# Patient Record
Sex: MALE | Race: WHITE
[De-identification: names, ages, dates, MRNs, and addresses within clinical notes are randomized per-mention and may not be internally consistent; named-entity substitution may affect disease eponyms.]

---

## 2024-08-26 PROBLEM — Z00.00 ENCOUNTER FOR PREVENTIVE HEALTH EXAMINATION: Status: ACTIVE | Noted: 2024-08-26

## 2024-08-29 ENCOUNTER — APPOINTMENT (OUTPATIENT)
Dept: ORTHOPEDIC SURGERY | Facility: CLINIC | Age: 54
End: 2024-08-29
Payer: MEDICARE

## 2024-08-29 VITALS — BODY MASS INDEX: 24.5 KG/M2 | WEIGHT: 175 LBS | HEIGHT: 71 IN

## 2024-08-29 DIAGNOSIS — J45.909 UNSPECIFIED ASTHMA, UNCOMPLICATED: ICD-10-CM

## 2024-08-29 DIAGNOSIS — E11.9 TYPE 2 DIABETES MELLITUS W/OUT COMPLICATIONS: ICD-10-CM

## 2024-08-29 DIAGNOSIS — M47.12 OTHER SPONDYLOSIS WITH MYELOPATHY, CERVICAL REGION: ICD-10-CM

## 2024-08-29 DIAGNOSIS — I10 ESSENTIAL (PRIMARY) HYPERTENSION: ICD-10-CM

## 2024-08-29 PROCEDURE — 99204 OFFICE O/P NEW MOD 45 MIN: CPT

## 2024-08-29 RX ORDER — OXYCODONE 10 MG/1
10 TABLET ORAL
Refills: 0 | Status: ACTIVE | COMMUNITY

## 2024-08-29 RX ORDER — ALPRAZOLAM 0.5 MG/1
0.5 TABLET ORAL
Refills: 0 | Status: ACTIVE | COMMUNITY

## 2024-08-29 RX ORDER — METFORMIN HYDROCHLORIDE 1000 MG/1
1000 TABLET, FILM COATED, EXTENDED RELEASE ORAL
Refills: 0 | Status: ACTIVE | COMMUNITY

## 2024-08-29 NOTE — HISTORY OF PRESENT ILLNESS
[8] : 8 [7] : 7 [Dull/Aching] : dull/aching [de-identified] : 53 M with chronic neck and arm pain. Hx of ACDF done 20 years ago.  C5-7.  Did well for a hwl ahs had chronic neck pain and arm pain treated with oxycodone.  Had issues with pain medication recently due to switching doctors. Saw primary surgeon recently who recommend CT can and possible neck surgery.  Has increased hand dysfunction.  No bowel or bladder symptoms. as balance issues but partially due to amputation.   [] : no [FreeTextEntry1] : neck [FreeTextEntry5] : pain and nueropathy for years seen ortho,PM,neuro had MRi neck at [facility]7/11  h/o neck fusion over 15 years ago. has not had inj inneck due to blood sugar been in therapy he was hospitalized for last week due to pain meds stopped by GP

## 2024-08-29 NOTE — ASSESSMENT
[FreeTextEntry1] : 53 M with ASD and cervical myelopathy.  MRI with C3-4 and C4-5 cord compression CT C spine for presurgical planning Referral to pain medication doctor for oxycodone FU after CT scan to further discuss surgier. likely C3-C5 laminectomy andC3-C7  fusion

## 2024-08-29 NOTE — IMAGING
[de-identified] : Spine: Inspection/Palpation: No tenderness to palpation throughout Cervical/thoracic/lumbar spine.  B/L hand atrophy well healed No bony step-offs, No lesions.   Gait: antalgic,     Neurologic: Bilateral upper extremities 5/5 Deltoid/Biceps/Triceps/ Wrist Flexion/Wrist Extension/ / Intrinsics Except   Sensation intact to light touch C5-T1      Negative Guillen's,  No inverted brachioradialis reflex        MRI Cervical spine reviewed and interpreted independently.  Agree with report as follows. . Status post anterior cervical discectomy and fusion at C5-C6 and C6-C7 without significant residual spinal canal stenosis or nerve compression at the surgical levels. 2. Degenerative changes with spinal canal stenosis and cord compression at C3-C4 and C4-C5. No cord signal abnormality appreciated. 3. Multilevel neural foraminal stenosis outlined above.

## 2024-09-09 ENCOUNTER — APPOINTMENT (OUTPATIENT)
Dept: CT IMAGING | Facility: CLINIC | Age: 54
End: 2024-09-09

## 2024-09-23 ENCOUNTER — APPOINTMENT (OUTPATIENT)
Dept: ORTHOPEDIC SURGERY | Facility: CLINIC | Age: 54
End: 2024-09-23
Payer: MEDICARE

## 2024-09-23 VITALS — WEIGHT: 175 LBS | HEIGHT: 71 IN | BODY MASS INDEX: 24.5 KG/M2

## 2024-09-23 DIAGNOSIS — M47.12 OTHER SPONDYLOSIS WITH MYELOPATHY, CERVICAL REGION: ICD-10-CM

## 2024-09-23 PROCEDURE — 99215 OFFICE O/P EST HI 40 MIN: CPT

## 2024-09-23 NOTE — IMAGING
[de-identified] : Spine: Inspection/Palpation: No tenderness to palpation throughout Cervical/thoracic/lumbar spine.  B/L hand atrophy well healed No bony step-offs, No lesions.   Gait: antalgic,     Neurologic: Bilateral upper extremities 5/5 Deltoid/Biceps/Triceps/ Wrist Flexion/Wrist Extension/ / Intrinsics Except   Sensation intact to light touch C5-T1      Negative Guillen's,  No inverted brachioradialis reflex        MRI Cervical spine reviewed and interpreted independently.  Agree with report as follows. . Status post anterior cervical discectomy and fusion at C5-C6 and C6-C7 without significant residual spinal canal stenosis or nerve compression at the surgical levels. 2. Degenerative changes with spinal canal stenosis and cord compression at C3-C4 and C4-C5. No cord signal abnormality appreciated. 3. Multilevel neural foraminal stenosis outlined above.

## 2024-09-23 NOTE — HISTORY OF PRESENT ILLNESS
[8] : 8 [7] : 7 [Dull/Aching] : dull/aching [de-identified] : 09/23/2024: Follow up visit. CT cervical review today.   8/29[birth year]: 53 M with chronic neck and arm pain. Hx of ACDF done 20 years ago.  C5-7.  Did well for a hwl ahs had chronic neck pain and arm pain treated with oxycodone.  Had issues with pain medication recently due to switching doctors. Saw primary surgeon recently who recommend CT can and possible neck surgery.  Has increased hand dysfunction.  No bowel or bladder symptoms. as balance issues but partially due to amputation.   [] : no [FreeTextEntry1] : neck

## 2024-09-23 NOTE — ASSESSMENT
[FreeTextEntry1] : 53 M with ASD and cervical myelopathy.  MRI with C3-4 and C4-5 cord compression CT C spine for presurgical planning Referral to pain medication doctor for oxycodone FU after CT scan to further discuss surgery. C3-C5 laminectomy and C3-C7  fusion   Risks and benefits of surgery including but not limited to bleeding, infection, damage to surrounding structures, hardware complication, pseudoarthrosis, need for revision surgery, recurrent laryngeal nerve palsy, paralysis, anesthetic complications, persistent symptoms, persistent neck pain, were discussed

## 2024-09-27 RX ORDER — TRAMADOL HYDROCHLORIDE 50 MG/1
50 TABLET, COATED ORAL EVERY 8 HOURS
Qty: 30 | Refills: 0 | Status: ACTIVE | COMMUNITY
Start: 2024-09-27 | End: 1900-01-01

## 2024-10-03 RX ORDER — SODIUM CHLORIDE IRRIG SOLUTION 0.9 %
1000 SOLUTION, IRRIGATION IRRIGATION
Refills: 0 | Status: DISCONTINUED | OUTPATIENT
Start: 2024-10-10 | End: 2024-10-10

## 2024-10-03 RX ORDER — SODIUM CHLORIDE 0.9 % (FLUSH) 0.9 %
3 SYRINGE (ML) INJECTION EVERY 8 HOURS
Refills: 0 | Status: DISCONTINUED | OUTPATIENT
Start: 2024-10-10 | End: 2024-10-10

## 2024-10-03 RX ORDER — CHLORHEXIDINE GLUCONATE ORAL RINSE 1.2 MG/ML
1 SOLUTION DENTAL DAILY
Refills: 0 | Status: DISCONTINUED | OUTPATIENT
Start: 2024-10-10 | End: 2024-10-10

## 2024-10-09 NOTE — ASU PATIENT PROFILE, ADULT - NSICDXPASTMEDICALHX_GEN_ALL_CORE_FT
PAST MEDICAL HISTORY:  Asthma     H/O osteomyelitis     Obstructive sleep apnea     Other spondylosis with myelopathy, cervical region     Type 2 diabetes mellitus

## 2024-10-09 NOTE — ASU PATIENT PROFILE, ADULT - NSICDXPASTSURGICALHX_GEN_ALL_CORE_FT
PAST SURGICAL HISTORY:  Amputation of one or more toes     H/O cervical discectomy     History of left below knee amputation

## 2024-10-10 ENCOUNTER — APPOINTMENT (OUTPATIENT)
Dept: ORTHOPEDIC SURGERY | Facility: HOSPITAL | Age: 54
End: 2024-10-10

## 2024-10-10 ENCOUNTER — INPATIENT (INPATIENT)
Facility: HOSPITAL | Age: 54
LOS: 0 days | Discharge: HOME | End: 2024-10-10
Attending: STUDENT IN AN ORGANIZED HEALTH CARE EDUCATION/TRAINING PROGRAM | Admitting: STUDENT IN AN ORGANIZED HEALTH CARE EDUCATION/TRAINING PROGRAM

## 2024-10-10 VITALS
SYSTOLIC BLOOD PRESSURE: 141 MMHG | HEART RATE: 71 BPM | TEMPERATURE: 98 F | OXYGEN SATURATION: 98 % | RESPIRATION RATE: 16 BRPM | WEIGHT: 190.04 LBS | DIASTOLIC BLOOD PRESSURE: 78 MMHG | HEIGHT: 69.5 IN

## 2024-10-10 DIAGNOSIS — S98.139A COMPLETE TRAUMATIC AMPUTATION OF ONE UNSPECIFIED LESSER TOE, INITIAL ENCOUNTER: Chronic | ICD-10-CM

## 2024-10-10 DIAGNOSIS — M47.12 OTHER SPONDYLOSIS WITH MYELOPATHY, CERVICAL REGION: ICD-10-CM

## 2024-10-10 DIAGNOSIS — Z98.890 OTHER SPECIFIED POSTPROCEDURAL STATES: Chronic | ICD-10-CM

## 2024-10-10 DIAGNOSIS — Z89.512 ACQUIRED ABSENCE OF LEFT LEG BELOW KNEE: Chronic | ICD-10-CM

## 2024-10-10 LAB
GLUCOSE BLDC GLUCOMTR-MCNC: 279 MG/DL — HIGH (ref 70–99)
GLUCOSE BLDC GLUCOMTR-MCNC: 313 MG/DL — HIGH (ref 70–99)

## 2024-10-10 RX ORDER — INSULIN REGULAR, HUMAN 100/ML
4 VIAL (ML) INJECTION ONCE
Refills: 0 | Status: COMPLETED | OUTPATIENT
Start: 2024-10-10 | End: 2024-10-10

## 2024-10-10 RX ORDER — ACETAMINOPHEN 325 MG
975 TABLET ORAL ONCE
Refills: 0 | Status: COMPLETED | OUTPATIENT
Start: 2024-10-10 | End: 2024-10-10

## 2024-10-10 RX ORDER — PREGABALIN 25 MG/1
150 CAPSULE ORAL ONCE
Refills: 0 | Status: DISCONTINUED | OUTPATIENT
Start: 2024-10-10 | End: 2024-10-10

## 2024-10-10 RX ORDER — POVIDONE-IODINE 10 %
1 SOLUTION, NON-ORAL TOPICAL ONCE
Refills: 0 | Status: COMPLETED | OUTPATIENT
Start: 2024-10-10 | End: 2024-10-10

## 2024-10-10 RX ORDER — TRAMADOL HYDROCHLORIDE 50 MG/1
50 TABLET, COATED ORAL ONCE
Refills: 0 | Status: DISCONTINUED | OUTPATIENT
Start: 2024-10-10 | End: 2024-10-10

## 2024-10-10 RX ORDER — PANTOPRAZOLE SODIUM 40 MG/1
40 TABLET, DELAYED RELEASE ORAL ONCE
Refills: 0 | Status: COMPLETED | OUTPATIENT
Start: 2024-10-10 | End: 2024-10-10

## 2024-10-10 RX ADMIN — TRAMADOL HYDROCHLORIDE 50 MILLIGRAM(S): 50 TABLET, COATED ORAL at 07:30

## 2024-10-10 RX ADMIN — Medication 975 MILLIGRAM(S): at 07:30

## 2024-10-10 RX ADMIN — Medication 4 UNIT(S): at 07:45

## 2024-10-10 RX ADMIN — CHLORHEXIDINE GLUCONATE ORAL RINSE 1 APPLICATION(S): 1.2 SOLUTION DENTAL at 07:31

## 2024-10-10 RX ADMIN — PANTOPRAZOLE SODIUM 40 MILLIGRAM(S): 40 TABLET, DELAYED RELEASE ORAL at 07:29

## 2024-10-10 RX ADMIN — PREGABALIN 150 MILLIGRAM(S): 25 CAPSULE ORAL at 07:29

## 2024-10-10 RX ADMIN — Medication 1 APPLICATION(S): at 08:05

## 2024-10-10 RX ADMIN — Medication 30 MILLILITER(S): at 07:30

## 2024-10-10 NOTE — PROVIDER CONTACT NOTE (OTHER) - ASSESSMENT
patient asymptomatic, 4 units regular insulin given, rechecked DOUG Silverio MD made aware  Case cancelled by Dr Arvizu,

## 2024-10-10 NOTE — PROVIDER CONTACT NOTE (OTHER) - SITUATION
ASU pre surgical blood glucose 313, ASU pre surgical patient blood glucose 313. Anesthesia Dr Nguyen and dr Arvizu made aware

## 2024-10-11 PROBLEM — J45.909 UNSPECIFIED ASTHMA, UNCOMPLICATED: Chronic | Status: ACTIVE | Noted: 2024-10-03

## 2024-10-11 PROBLEM — M47.12 OTHER SPONDYLOSIS WITH MYELOPATHY, CERVICAL REGION: Chronic | Status: ACTIVE | Noted: 2024-10-03

## 2024-10-11 PROBLEM — Z87.39 PERSONAL HISTORY OF OTHER DISEASES OF THE MUSCULOSKELETAL SYSTEM AND CONNECTIVE TISSUE: Chronic | Status: ACTIVE | Noted: 2024-10-03

## 2024-10-11 PROBLEM — G47.33 OBSTRUCTIVE SLEEP APNEA (ADULT) (PEDIATRIC): Chronic | Status: ACTIVE | Noted: 2024-10-03

## 2024-10-11 PROBLEM — E11.9 TYPE 2 DIABETES MELLITUS WITHOUT COMPLICATIONS: Chronic | Status: ACTIVE | Noted: 2024-10-03

## 2024-10-18 RX ORDER — OXYCODONE 5 MG/1
5 TABLET ORAL
Qty: 30 | Refills: 0 | Status: ACTIVE | COMMUNITY
Start: 2024-10-11 | End: 1900-01-01

## 2024-10-21 ENCOUNTER — APPOINTMENT (OUTPATIENT)
Dept: ORTHOPEDIC SURGERY | Facility: CLINIC | Age: 54
End: 2024-10-21
Payer: MEDICARE

## 2024-10-21 ENCOUNTER — APPOINTMENT (OUTPATIENT)
Dept: ENDOCRINOLOGY | Facility: CLINIC | Age: 54
End: 2024-10-21
Payer: MEDICARE

## 2024-10-21 VITALS — BODY MASS INDEX: 28.28 KG/M2 | HEIGHT: 71 IN | WEIGHT: 202 LBS

## 2024-10-21 VITALS
WEIGHT: 202 LBS | HEIGHT: 71 IN | HEART RATE: 105 BPM | BODY MASS INDEX: 28.28 KG/M2 | SYSTOLIC BLOOD PRESSURE: 140 MMHG | OXYGEN SATURATION: 98 % | DIASTOLIC BLOOD PRESSURE: 86 MMHG

## 2024-10-21 DIAGNOSIS — E11.9 TYPE 2 DIABETES MELLITUS W/OUT COMPLICATIONS: ICD-10-CM

## 2024-10-21 LAB
GLUCOSE BLDC GLUCOMTR-MCNC: 282
HBA1C MFR BLD HPLC: 8.1

## 2024-10-21 PROCEDURE — 82962 GLUCOSE BLOOD TEST: CPT

## 2024-10-21 PROCEDURE — G2211 COMPLEX E/M VISIT ADD ON: CPT

## 2024-10-21 PROCEDURE — 99205 OFFICE O/P NEW HI 60 MIN: CPT

## 2024-10-21 PROCEDURE — 99213 OFFICE O/P EST LOW 20 MIN: CPT

## 2024-10-21 PROCEDURE — 83036 HEMOGLOBIN GLYCOSYLATED A1C: CPT | Mod: QW

## 2024-10-21 RX ORDER — LANCING DEVICE
W/DEVICE EACH MISCELLANEOUS
Qty: 1 | Refills: 0 | Status: ACTIVE | COMMUNITY
Start: 2024-10-21 | End: 1900-01-01

## 2024-10-21 RX ORDER — INSULIN GLARGINE 100 [IU]/ML
100 INJECTION, SOLUTION SUBCUTANEOUS
Qty: 1 | Refills: 0 | Status: ACTIVE | COMMUNITY
Start: 2024-10-21 | End: 1900-01-01

## 2024-10-21 RX ORDER — PEN NEEDLE, DIABETIC 29 G X1/2"
32G X 4 MM NEEDLE, DISPOSABLE MISCELLANEOUS
Qty: 1 | Refills: 0 | Status: ACTIVE | COMMUNITY
Start: 2024-10-21 | End: 1900-01-01

## 2024-10-21 RX ORDER — ALCOHOL ANTISEPTIC PADS
PADS, MEDICATED (EA) TOPICAL
Qty: 3 | Refills: 3 | Status: ACTIVE | COMMUNITY
Start: 2024-10-21 | End: 1900-01-01

## 2024-10-21 RX ORDER — CHOLECALCIFEROL (VITAMIN D3) 10(400)/ML
DROPS ORAL
Qty: 3 | Refills: 3 | Status: ACTIVE | COMMUNITY
Start: 2024-10-21 | End: 1900-01-01

## 2024-10-21 RX ORDER — LANCING DEVICE
EACH MISCELLANEOUS 3 TIMES DAILY
Qty: 6 | Refills: 1 | Status: ACTIVE | COMMUNITY
Start: 2024-10-21 | End: 1900-01-01

## 2024-10-25 ENCOUNTER — RX RENEWAL (OUTPATIENT)
Age: 54
End: 2024-10-25

## 2024-10-29 ENCOUNTER — NON-APPOINTMENT (OUTPATIENT)
Age: 54
End: 2024-10-29

## 2024-10-29 RX ORDER — METFORMIN HYDROCHLORIDE 1000 MG/1
1000 TABLET, COATED ORAL
Qty: 180 | Refills: 0 | Status: ACTIVE | COMMUNITY
Start: 2024-10-29 | End: 1900-01-01

## 2024-10-31 ENCOUNTER — RX RENEWAL (OUTPATIENT)
Age: 54
End: 2024-10-31

## 2024-11-01 ENCOUNTER — APPOINTMENT (OUTPATIENT)
Dept: ORTHOPEDIC SURGERY | Facility: CLINIC | Age: 54
End: 2024-11-01
Payer: MEDICARE

## 2024-11-01 DIAGNOSIS — M47.12 OTHER SPONDYLOSIS WITH MYELOPATHY, CERVICAL REGION: ICD-10-CM

## 2024-11-01 PROCEDURE — 99213 OFFICE O/P EST LOW 20 MIN: CPT

## 2024-11-13 ENCOUNTER — RX RENEWAL (OUTPATIENT)
Age: 54
End: 2024-11-13

## 2024-11-15 ENCOUNTER — OUTPATIENT (OUTPATIENT)
Dept: OUTPATIENT SERVICES | Facility: HOSPITAL | Age: 54
LOS: 1 days | End: 2024-11-15

## 2024-11-15 VITALS
HEIGHT: 70 IN | OXYGEN SATURATION: 98 % | RESPIRATION RATE: 16 BRPM | DIASTOLIC BLOOD PRESSURE: 89 MMHG | HEART RATE: 91 BPM | TEMPERATURE: 98 F | WEIGHT: 201.94 LBS | SYSTOLIC BLOOD PRESSURE: 170 MMHG

## 2024-11-15 DIAGNOSIS — G89.29 OTHER CHRONIC PAIN: ICD-10-CM

## 2024-11-15 DIAGNOSIS — E11.9 TYPE 2 DIABETES MELLITUS WITHOUT COMPLICATIONS: ICD-10-CM

## 2024-11-15 DIAGNOSIS — Z98.890 OTHER SPECIFIED POSTPROCEDURAL STATES: Chronic | ICD-10-CM

## 2024-11-15 DIAGNOSIS — M47.12 OTHER SPONDYLOSIS WITH MYELOPATHY, CERVICAL REGION: ICD-10-CM

## 2024-11-15 DIAGNOSIS — Z89.421 ACQUIRED ABSENCE OF OTHER RIGHT TOE(S): Chronic | ICD-10-CM

## 2024-11-15 DIAGNOSIS — Z89.512 ACQUIRED ABSENCE OF LEFT LEG BELOW KNEE: Chronic | ICD-10-CM

## 2024-11-15 DIAGNOSIS — S98.139A COMPLETE TRAUMATIC AMPUTATION OF ONE UNSPECIFIED LESSER TOE, INITIAL ENCOUNTER: Chronic | ICD-10-CM

## 2024-11-15 LAB
A1C WITH ESTIMATED AVERAGE GLUCOSE RESULT: 8 % — HIGH (ref 4–5.6)
ANION GAP SERPL CALC-SCNC: 13 MMOL/L — SIGNIFICANT CHANGE UP (ref 7–14)
BASOPHILS # BLD AUTO: 0.03 K/UL — SIGNIFICANT CHANGE UP (ref 0–0.2)
BASOPHILS NFR BLD AUTO: 0.4 % — SIGNIFICANT CHANGE UP (ref 0–2)
BLD GP AB SCN SERPL QL: NEGATIVE — SIGNIFICANT CHANGE UP
BUN SERPL-MCNC: 28 MG/DL — HIGH (ref 7–23)
CALCIUM SERPL-MCNC: 9.4 MG/DL — SIGNIFICANT CHANGE UP (ref 8.4–10.5)
CHLORIDE SERPL-SCNC: 104 MMOL/L — SIGNIFICANT CHANGE UP (ref 98–107)
CO2 SERPL-SCNC: 23 MMOL/L — SIGNIFICANT CHANGE UP (ref 22–31)
CREAT SERPL-MCNC: 0.94 MG/DL — SIGNIFICANT CHANGE UP (ref 0.5–1.3)
EGFR: 96 ML/MIN/1.73M2 — SIGNIFICANT CHANGE UP
EOSINOPHIL # BLD AUTO: 0.25 K/UL — SIGNIFICANT CHANGE UP (ref 0–0.5)
EOSINOPHIL NFR BLD AUTO: 3.1 % — SIGNIFICANT CHANGE UP (ref 0–6)
ESTIMATED AVERAGE GLUCOSE: 183 — SIGNIFICANT CHANGE UP
GLUCOSE SERPL-MCNC: 196 MG/DL — HIGH (ref 70–99)
HCT VFR BLD CALC: 40.1 % — SIGNIFICANT CHANGE UP (ref 39–50)
HGB BLD-MCNC: 13 G/DL — SIGNIFICANT CHANGE UP (ref 13–17)
IMM GRANULOCYTES NFR BLD AUTO: 0.4 % — SIGNIFICANT CHANGE UP (ref 0–0.9)
LYMPHOCYTES # BLD AUTO: 1.34 K/UL — SIGNIFICANT CHANGE UP (ref 1–3.3)
LYMPHOCYTES # BLD AUTO: 16.7 % — SIGNIFICANT CHANGE UP (ref 13–44)
MCHC RBC-ENTMCNC: 28.8 PG — SIGNIFICANT CHANGE UP (ref 27–34)
MCHC RBC-ENTMCNC: 32.4 G/DL — SIGNIFICANT CHANGE UP (ref 32–36)
MCV RBC AUTO: 88.9 FL — SIGNIFICANT CHANGE UP (ref 80–100)
MONOCYTES # BLD AUTO: 0.43 K/UL — SIGNIFICANT CHANGE UP (ref 0–0.9)
MONOCYTES NFR BLD AUTO: 5.3 % — SIGNIFICANT CHANGE UP (ref 2–14)
MRSA PCR RESULT.: SIGNIFICANT CHANGE UP
NEUTROPHILS # BLD AUTO: 5.96 K/UL — SIGNIFICANT CHANGE UP (ref 1.8–7.4)
NEUTROPHILS NFR BLD AUTO: 74.1 % — SIGNIFICANT CHANGE UP (ref 43–77)
PLATELET # BLD AUTO: 202 K/UL — SIGNIFICANT CHANGE UP (ref 150–400)
POTASSIUM SERPL-MCNC: 4.9 MMOL/L — SIGNIFICANT CHANGE UP (ref 3.5–5.3)
POTASSIUM SERPL-SCNC: 4.9 MMOL/L — SIGNIFICANT CHANGE UP (ref 3.5–5.3)
RBC # BLD: 4.51 M/UL — SIGNIFICANT CHANGE UP (ref 4.2–5.8)
RBC # FLD: 13.2 % — SIGNIFICANT CHANGE UP (ref 10.3–14.5)
RH IG SCN BLD-IMP: POSITIVE — SIGNIFICANT CHANGE UP
S AUREUS DNA NOSE QL NAA+PROBE: SIGNIFICANT CHANGE UP
SODIUM SERPL-SCNC: 140 MMOL/L — SIGNIFICANT CHANGE UP (ref 135–145)
WBC # BLD: 8.04 K/UL — SIGNIFICANT CHANGE UP (ref 3.8–10.5)
WBC # FLD AUTO: 8.04 K/UL — SIGNIFICANT CHANGE UP (ref 3.8–10.5)

## 2024-11-15 RX ORDER — OXYCODONE HYDROCHLORIDE 30 MG/1
0 TABLET ORAL
Refills: 0 | DISCHARGE

## 2024-11-15 NOTE — H&P PST ADULT - HISTORY OF PRESENT ILLNESS
53 male poor historian presents for preop eval for scheduled C3-5 laminectomy, C3-7 posterior spinal fusion.  Pt reports  chronic neck and arm pain for several years that has progressively worsened on tramadol PRN. Hx of anterior cervical discectomy and fusion  done 20 years ago. C5-7.   States was on oxycodone for  chronic neck pain and arm pain, but had issues with pain medication because he switched doctors.  Pt had recent CT scan  & possible surgery.  C/o increased   weakness & numbness left arm. 53 male poor historian presents for preop eval for scheduled C3-5 laminectomy, C3-7 posterior spinal fusion.  Pt reports  chronic neck and arm pain for several years that has progressively worsened. Hx of anterior cervical discectomy and fusion  done 20 years ago. C5-7.   States was on oxycodone for  chronic neck pain and arm pain, but had issues with pain medication because he switched doctors. C/o increased   weakness & numbness left arm. Intinial procedure was in october, but procedure was cancelled due to elevated BG remaining >200 despite insulin given by anesthesiologist. patient was evaluated by endocrine was started on insulin, as per patient patient his BS better control now 53 male poor historian presents for preop eval for scheduled C3-5 laminectomy, C3-7 posterior spinal fusion.  Pt reports  chronic neck and arm pain for several years that has progressively worsened. Hx of anterior cervical discectomy and fusion  done 20 years ago. C5-7.   States was on oxycodone for  chronic neck pain and arm pain, but had issues with pain medication because he switched doctors. C/o increased   weakness & numbness left arm. Intinial procedure was in october, but procedure was cancelled due to elevated BG remaining >200 despite insulin given by anesthesiologist. Patient was evaluated by endocrine was started on insulin, as per patient patient his BS controlled now 53 male poor historian with hx of Afib, +Possible CAD, DM II and ?CHF presents for preop eval for scheduled C3-5 laminectomy, C3-7 posterior spinal fusion.  Pt reports  chronic neck and arm pain for several years that has progressively worsened. Hx of anterior cervical discectomy and fusion  done 20 years ago. C5-7.   States was on oxycodone for  chronic neck pain and arm pain, but had issues with pain medication because he switched doctors, now the pain meds prescribed by Dr Arvizu   C/o increased   weakness & numbness left arm. Initial l procedure was in october, but procedure was cancelled due to elevated BG remaining >200 despite insulin given by anesthesiologist. Patient was evaluated by endocrine was started on insulin, as per patient his BS controlled now -200

## 2024-11-15 NOTE — H&P PST ADULT - ENDOCRINE COMMENTS
type II DM on metformin and Lantus 230-250 type II DM on metformin and Lantus -250 type II DM on metformin and Lantus -200

## 2024-11-15 NOTE — H&P PST ADULT - PROBLEM SELECTOR PLAN 1
Patient tentatively scheduled for    Pre-op instructions provided.  Famotidine provided with instructions. Hibiclens provided with instructions and was signed by patient. Teach-back method was utilized to assess patient's understanding. Patient verbalized understanding.    ordered CBC, BMP, A1c, type , MRSA Patient tentatively scheduled for scheduled C3-5 laminectomy, C3-7 posterior spinal fusion.     Pre-op instructions provided.  Famotidine provided with instructions. Hibiclens provided with instructions and was signed by patient. Teach-back method was utilized to assess patient's understanding. Patient verbalized understanding.    ordered CBC, BMP, A1c, type , MRSA

## 2024-11-15 NOTE — H&P PST ADULT - LAST CARDIAC ANGIOGRAM/IMAGING
Jan 2024---at [facility], patient had PNA>>septic shock ---reduced EF?---No intervention at the time

## 2024-11-15 NOTE — H&P PST ADULT - NSICDXPASTSURGICALHX_GEN_ALL_CORE_FT
PAST SURGICAL HISTORY:  Amputation of one or more toes     H/O cervical discectomy     History of left below knee amputation     Right toe amputee

## 2024-11-15 NOTE — H&P PST ADULT - NSICDXPASTMEDICALHX_GEN_ALL_CORE_FT
PAST MEDICAL HISTORY:  Asthma     H/O osteomyelitis     Obstructive sleep apnea     Other spondylosis with myelopathy, cervical region     Pulmonary embolism     Type 2 diabetes mellitus      PAST MEDICAL HISTORY:  Afib     Asthma     CAD (coronary artery disease)     H/O osteomyelitis     Obstructive sleep apnea     Other spondylosis with myelopathy, cervical region     Pulmonary embolism     Type 2 diabetes mellitus

## 2024-11-15 NOTE — H&P PST ADULT - PROBLEM SELECTOR PLAN 2
Patient instructed to hold Metformin the night before and the morning of procedure. Pt stated understanding.     Patient instructed take only 80% of lantus usual dose night before the procedure, patient will take 14  units night before the procedure Patient instructed to hold Metformin the night before and the morning of procedure. Pt stated understanding.     Patient instructed take only 80% of Lantus usual dose night before the procedure, patient will take 14  units night before the procedure Patient instructed to hold Metformin the morning of procedure. Pt stated understanding.     Patient instructed take only 80% of Lantus usual dose night before the procedure, patient will take 14  units of Lantus night before the procedure

## 2024-11-15 NOTE — H&P PST ADULT - PROBLEM SELECTOR PLAN 3
Patient instructed to take oxycodone with a sip of water on the morning of procedure.    In Jan 2024 patient was admitted at [facility] with PNA, patient went into septic shock ---Heart failure/reduced EF?.     Afib. +Possible CAD and CHF? was told heart function was 15% but unclear. patient went to see his cardiologist on 10/24. bianka yan Patient instructed to take oxycodone with a sip of water on the morning of procedure.    In Jan 2024 patient was admitted at [facility] with PNA, patient went into septic shock ---Heart failure/reduced EF?.     Afib. +Possible CAD and CHF? was told heart function was 15% but unclear. patient saw his cardiologist on 10/1/24. will request a copy Patient instructed to take oxycodone with a sip of water on the morning of procedure.    In Jan 2024 patient was admitted at [facility] with PNA, patient went into septic shock ---Heart failure/reduced EF?.     Afib. +Possible CAD and CHF? was told heart function in Jan 2024 was 15% but unclear. patient saw his cardiologist on 10/1/24. will request a copy of cardiac note

## 2024-11-15 NOTE — H&P PST ADULT - NSICDXFAMILYHX_GEN_ALL_CORE_FT
FAMILY HISTORY:  Father  Still living? Unknown  FH: type 2 diabetes, Age at diagnosis: Age Unknown    Mother  Still living? No  Family hx of lung cancer, Age at diagnosis: Age Unknown    Grandparent  Still living? No  Family history of liver cancer, Age at diagnosis: Age Unknown  FH: type 2 diabetes, Age at diagnosis: Age Unknown  FHx: heart disease, Age at diagnosis: Age Unknown

## 2024-11-15 NOTE — H&P PST ADULT - ATTENDING COMMENTS
53 year old male with neck pain upper extremity dysfunction and gait dysfunction. History of  C5-7 ACDF and Posterior laminectomy at C6-7 as well.  Patient with Adjacent segment disease, cord compression and myelopathic symptoms. Has significant hand atrophy. Discussed natural history of myelopathy with patient at length in office and goals of surgery were to prevent progression.  Discussed unsure if he will see much increase in hand strength given severe atrophy.   MRI with C3-5 cord compression. Discussed best option is posterior cervical decompression and fusion from C3-7.  Risks and benefits of surgery including but not limited to bleeding, infection, damage to surrounding structures, paralysis, hardware malposition, hardware failure, need for revision procedure, persistent or new numbness, persistent or new weakness, persistent or new tingling, c5 palsy, durotomy, csf leak. All questions were answered and understanding was verbalized.  Patient was in agreement with plan.  Patient glucose was elevated on morn ing of surgery.  Given severe pain he is in anesthesia felt it was stress related.  Will give insulin and recheck.  AS long as sugar resolves to an improved will move ahead with surgery. Surgery was attempter 6 weeks ago but had higher sugars at that time. Since has bene evaluated and treated with endocrine with significant improvement of his blood sugars.  Given diabetes and revision posterior closure will have plastic surgery close wound.

## 2024-11-15 NOTE — H&P PST ADULT - MUSCULOSKELETAL
ROM intact/strength 5/5 bilateral upper extremities/strength 5/5 bilateral lower extremities details… ROM intact/strength 5/5 bilateral upper extremities/strength 5/5 bilateral lower extremities/abnormal gait

## 2024-11-18 ENCOUNTER — APPOINTMENT (OUTPATIENT)
Dept: ORTHOPEDIC SURGERY | Facility: CLINIC | Age: 54
End: 2024-11-18
Payer: MEDICARE

## 2024-11-18 VITALS — WEIGHT: 202 LBS | BODY MASS INDEX: 28.28 KG/M2 | HEIGHT: 71 IN

## 2024-11-18 DIAGNOSIS — M47.12 OTHER SPONDYLOSIS WITH MYELOPATHY, CERVICAL REGION: ICD-10-CM

## 2024-11-18 PROBLEM — I26.99 OTHER PULMONARY EMBOLISM WITHOUT ACUTE COR PULMONALE: Chronic | Status: ACTIVE | Noted: 2024-11-15

## 2024-11-18 PROBLEM — I48.91 UNSPECIFIED ATRIAL FIBRILLATION: Chronic | Status: ACTIVE | Noted: 2024-11-15

## 2024-11-18 PROBLEM — I25.10 ATHEROSCLEROTIC HEART DISEASE OF NATIVE CORONARY ARTERY WITHOUT ANGINA PECTORIS: Chronic | Status: ACTIVE | Noted: 2024-11-15

## 2024-11-18 PROCEDURE — 99213 OFFICE O/P EST LOW 20 MIN: CPT

## 2024-11-20 NOTE — ASU PATIENT PROFILE, ADULT - PATIENT'S PREFERRED PRONOUN
Him/He
For information on Fall & Injury Prevention, visit: [email or web address] OR  [email or web address] OR  https://www.cdc.gov/steadi/patient.html

## 2024-11-21 ENCOUNTER — INPATIENT (INPATIENT)
Facility: HOSPITAL | Age: 54
LOS: 4 days | Discharge: HOME HEALTH | End: 2024-11-26
Attending: STUDENT IN AN ORGANIZED HEALTH CARE EDUCATION/TRAINING PROGRAM | Admitting: STUDENT IN AN ORGANIZED HEALTH CARE EDUCATION/TRAINING PROGRAM
Payer: MEDICARE

## 2024-11-21 ENCOUNTER — TRANSCRIPTION ENCOUNTER (OUTPATIENT)
Age: 54
End: 2024-11-21

## 2024-11-21 ENCOUNTER — APPOINTMENT (OUTPATIENT)
Dept: ORTHOPEDIC SURGERY | Facility: HOSPITAL | Age: 54
End: 2024-11-21
Payer: MEDICARE

## 2024-11-21 VITALS
OXYGEN SATURATION: 98 % | HEART RATE: 99 BPM | WEIGHT: 201.94 LBS | RESPIRATION RATE: 16 BRPM | HEIGHT: 70 IN | DIASTOLIC BLOOD PRESSURE: 98 MMHG | SYSTOLIC BLOOD PRESSURE: 158 MMHG | TEMPERATURE: 98 F

## 2024-11-21 DIAGNOSIS — E11.65 TYPE 2 DIABETES MELLITUS WITH HYPERGLYCEMIA: ICD-10-CM

## 2024-11-21 DIAGNOSIS — S98.139A COMPLETE TRAUMATIC AMPUTATION OF ONE UNSPECIFIED LESSER TOE, INITIAL ENCOUNTER: Chronic | ICD-10-CM

## 2024-11-21 DIAGNOSIS — M47.12 OTHER SPONDYLOSIS WITH MYELOPATHY, CERVICAL REGION: ICD-10-CM

## 2024-11-21 DIAGNOSIS — Z89.421 ACQUIRED ABSENCE OF OTHER RIGHT TOE(S): Chronic | ICD-10-CM

## 2024-11-21 DIAGNOSIS — Z98.890 OTHER SPECIFIED POSTPROCEDURAL STATES: Chronic | ICD-10-CM

## 2024-11-21 DIAGNOSIS — Z89.512 ACQUIRED ABSENCE OF LEFT LEG BELOW KNEE: Chronic | ICD-10-CM

## 2024-11-21 LAB
ANION GAP SERPL CALC-SCNC: 12 MMOL/L — SIGNIFICANT CHANGE UP (ref 7–14)
BASOPHILS # BLD AUTO: 0.02 K/UL — SIGNIFICANT CHANGE UP (ref 0–0.2)
BASOPHILS NFR BLD AUTO: 0.2 % — SIGNIFICANT CHANGE UP (ref 0–2)
BUN SERPL-MCNC: 34 MG/DL — HIGH (ref 7–23)
CALCIUM SERPL-MCNC: 9 MG/DL — SIGNIFICANT CHANGE UP (ref 8.4–10.5)
CHLORIDE SERPL-SCNC: 108 MMOL/L — HIGH (ref 98–107)
CO2 SERPL-SCNC: 18 MMOL/L — LOW (ref 22–31)
CREAT SERPL-MCNC: 0.97 MG/DL — SIGNIFICANT CHANGE UP (ref 0.5–1.3)
EGFR: 92 ML/MIN/1.73M2 — SIGNIFICANT CHANGE UP
EOSINOPHIL # BLD AUTO: 0.02 K/UL — SIGNIFICANT CHANGE UP (ref 0–0.5)
EOSINOPHIL NFR BLD AUTO: 0.2 % — SIGNIFICANT CHANGE UP (ref 0–6)
GAS PNL BLDA: SIGNIFICANT CHANGE UP
GLUCOSE BLDC GLUCOMTR-MCNC: 266 MG/DL — HIGH (ref 70–99)
GLUCOSE BLDC GLUCOMTR-MCNC: 357 MG/DL — HIGH (ref 70–99)
GLUCOSE BLDC GLUCOMTR-MCNC: 368 MG/DL — HIGH (ref 70–99)
GLUCOSE BLDC GLUCOMTR-MCNC: 386 MG/DL — HIGH (ref 70–99)
GLUCOSE SERPL-MCNC: 386 MG/DL — HIGH (ref 70–99)
HCT VFR BLD CALC: 36.4 % — LOW (ref 39–50)
HGB BLD-MCNC: 12.3 G/DL — LOW (ref 13–17)
IANC: 9.88 K/UL — HIGH (ref 1.8–7.4)
IMM GRANULOCYTES NFR BLD AUTO: 0.8 % — SIGNIFICANT CHANGE UP (ref 0–0.9)
LYMPHOCYTES # BLD AUTO: 0.49 K/UL — LOW (ref 1–3.3)
LYMPHOCYTES # BLD AUTO: 4.6 % — LOW (ref 13–44)
MCHC RBC-ENTMCNC: 29 PG — SIGNIFICANT CHANGE UP (ref 27–34)
MCHC RBC-ENTMCNC: 33.8 G/DL — SIGNIFICANT CHANGE UP (ref 32–36)
MCV RBC AUTO: 85.8 FL — SIGNIFICANT CHANGE UP (ref 80–100)
MONOCYTES # BLD AUTO: 0.06 K/UL — SIGNIFICANT CHANGE UP (ref 0–0.9)
MONOCYTES NFR BLD AUTO: 0.6 % — LOW (ref 2–14)
NEUTROPHILS # BLD AUTO: 9.88 K/UL — HIGH (ref 1.8–7.4)
NEUTROPHILS NFR BLD AUTO: 93.6 % — HIGH (ref 43–77)
NRBC # BLD: 0 /100 WBCS — SIGNIFICANT CHANGE UP (ref 0–0)
NRBC # FLD: 0 K/UL — SIGNIFICANT CHANGE UP (ref 0–0)
PLATELET # BLD AUTO: 180 K/UL — SIGNIFICANT CHANGE UP (ref 150–400)
POTASSIUM SERPL-MCNC: 4.6 MMOL/L — SIGNIFICANT CHANGE UP (ref 3.5–5.3)
POTASSIUM SERPL-SCNC: 4.6 MMOL/L — SIGNIFICANT CHANGE UP (ref 3.5–5.3)
RBC # BLD: 4.24 M/UL — SIGNIFICANT CHANGE UP (ref 4.2–5.8)
RBC # FLD: 13.1 % — SIGNIFICANT CHANGE UP (ref 10.3–14.5)
SODIUM SERPL-SCNC: 138 MMOL/L — SIGNIFICANT CHANGE UP (ref 135–145)
WBC # BLD: 10.55 K/UL — HIGH (ref 3.8–10.5)
WBC # FLD AUTO: 10.55 K/UL — HIGH (ref 3.8–10.5)

## 2024-11-21 PROCEDURE — 99358 PROLONG SERVICE W/O CONTACT: CPT | Mod: NC,GC

## 2024-11-21 PROCEDURE — 22600 ARTHRD PST TQ 1NTRSPC CRV: CPT

## 2024-11-21 PROCEDURE — 63048 LAM FACETEC &FORAMOT EA ADDL: CPT

## 2024-11-21 PROCEDURE — 63045 LAM FACETEC & FORAMOT CRV: CPT

## 2024-11-21 PROCEDURE — 20936 SP BONE AGRFT LOCAL ADD-ON: CPT

## 2024-11-21 PROCEDURE — 22842 INSERT SPINE FIXATION DEVICE: CPT

## 2024-11-21 PROCEDURE — 20930 SP BONE ALGRFT MORSEL ADD-ON: CPT

## 2024-11-21 PROCEDURE — 22614 ARTHRD PST TQ 1NTRSPC EA ADD: CPT

## 2024-11-21 DEVICE — SET SCREW VP 2 TULIP AND LAMINAR HOOK: Type: IMPLANTABLE DEVICE | Status: FUNCTIONAL

## 2024-11-21 DEVICE — IMPLANTABLE DEVICE: Type: IMPLANTABLE DEVICE | Status: FUNCTIONAL

## 2024-11-21 DEVICE — SCREW MA 3.5X12MM: Type: IMPLANTABLE DEVICE | Status: FUNCTIONAL

## 2024-11-21 DEVICE — BONE WAX 2.5GM: Type: IMPLANTABLE DEVICE | Status: FUNCTIONAL

## 2024-11-21 DEVICE — SURGIFLO MATRIX WITH THROMBIN KIT: Type: IMPLANTABLE DEVICE | Status: FUNCTIONAL

## 2024-11-21 DEVICE — MAYFIELD SKULL PIN ADULT PLASTIC: Type: IMPLANTABLE DEVICE | Status: FUNCTIONAL

## 2024-11-21 DEVICE — SURGIFOAM PAD 8CM X 12.5CM X 2MM (100C): Type: IMPLANTABLE DEVICE | Status: FUNCTIONAL

## 2024-11-21 RX ORDER — OXYCODONE HYDROCHLORIDE 30 MG/1
5 TABLET ORAL ONCE
Refills: 0 | Status: DISCONTINUED | OUTPATIENT
Start: 2024-11-21 | End: 2024-11-21

## 2024-11-21 RX ORDER — HYDROMORPHONE HYDROCHLORIDE 2 MG/1
0.5 TABLET ORAL
Refills: 0 | Status: DISCONTINUED | OUTPATIENT
Start: 2024-11-21 | End: 2024-11-22

## 2024-11-21 RX ORDER — POLYETHYLENE GLYCOL 3350 17 G/17G
17 POWDER, FOR SOLUTION ORAL DAILY
Refills: 0 | Status: DISCONTINUED | OUTPATIENT
Start: 2024-11-21 | End: 2024-11-26

## 2024-11-21 RX ORDER — ACETAMINOPHEN 500MG 500 MG/1
975 TABLET, COATED ORAL ONCE
Refills: 0 | Status: COMPLETED | OUTPATIENT
Start: 2024-11-21 | End: 2024-11-21

## 2024-11-21 RX ORDER — HYDROMORPHONE HYDROCHLORIDE 2 MG/1
1 TABLET ORAL
Refills: 0 | Status: DISCONTINUED | OUTPATIENT
Start: 2024-11-21 | End: 2024-11-21

## 2024-11-21 RX ORDER — 0.9 % SODIUM CHLORIDE 0.9 %
1000 INTRAVENOUS SOLUTION INTRAVENOUS
Refills: 0 | Status: DISCONTINUED | OUTPATIENT
Start: 2024-11-21 | End: 2024-11-26

## 2024-11-21 RX ORDER — PANTOPRAZOLE SODIUM 40 MG/1
40 TABLET, DELAYED RELEASE ORAL
Refills: 0 | Status: DISCONTINUED | OUTPATIENT
Start: 2024-11-21 | End: 2024-11-26

## 2024-11-21 RX ORDER — GLUCAGON INJECTION, SOLUTION 0.5 MG/.1ML
1 INJECTION, SOLUTION SUBCUTANEOUS ONCE
Refills: 0 | Status: DISCONTINUED | OUTPATIENT
Start: 2024-11-21 | End: 2024-11-26

## 2024-11-21 RX ORDER — GLUCAGON INJECTION, SOLUTION 0.5 MG/.1ML
1 INJECTION, SOLUTION SUBCUTANEOUS ONCE
Refills: 0 | Status: DISCONTINUED | OUTPATIENT
Start: 2024-11-21 | End: 2024-11-21

## 2024-11-21 RX ORDER — TRAMADOL HYDROCHLORIDE 300 MG/1
50 CAPSULE ORAL ONCE
Refills: 0 | Status: DISCONTINUED | OUTPATIENT
Start: 2024-11-21 | End: 2024-11-21

## 2024-11-21 RX ORDER — ONDANSETRON HYDROCHLORIDE 4 MG/1
4 TABLET, FILM COATED ORAL EVERY 6 HOURS
Refills: 0 | Status: DISCONTINUED | OUTPATIENT
Start: 2024-11-21 | End: 2024-11-26

## 2024-11-21 RX ORDER — SENNOSIDES 8.6 MG
2 TABLET ORAL AT BEDTIME
Refills: 0 | Status: DISCONTINUED | OUTPATIENT
Start: 2024-11-21 | End: 2024-11-26

## 2024-11-21 RX ORDER — DIAZEPAM 10 MG/1
5 TABLET ORAL ONCE
Refills: 0 | Status: DISCONTINUED | OUTPATIENT
Start: 2024-11-21 | End: 2024-11-21

## 2024-11-21 RX ORDER — CYCLOBENZAPRINE HCL 10 MG
10 TABLET ORAL EVERY 8 HOURS
Refills: 0 | Status: DISCONTINUED | OUTPATIENT
Start: 2024-11-21 | End: 2024-11-22

## 2024-11-21 RX ORDER — 0.9 % SODIUM CHLORIDE 0.9 %
1000 INTRAVENOUS SOLUTION INTRAVENOUS
Refills: 0 | Status: DISCONTINUED | OUTPATIENT
Start: 2024-11-21 | End: 2024-11-22

## 2024-11-21 RX ORDER — TRAMADOL HYDROCHLORIDE 300 MG/1
50 CAPSULE ORAL EVERY 6 HOURS
Refills: 0 | Status: DISCONTINUED | OUTPATIENT
Start: 2024-11-21 | End: 2024-11-22

## 2024-11-21 RX ORDER — 0.9 % SODIUM CHLORIDE 0.9 %
1000 INTRAVENOUS SOLUTION INTRAVENOUS
Refills: 0 | Status: DISCONTINUED | OUTPATIENT
Start: 2024-11-21 | End: 2024-11-21

## 2024-11-21 RX ORDER — CEFAZOLIN SODIUM 10 G
2000 VIAL (EA) INJECTION EVERY 8 HOURS
Refills: 0 | Status: COMPLETED | OUTPATIENT
Start: 2024-11-21 | End: 2024-11-22

## 2024-11-21 RX ORDER — ONDANSETRON HYDROCHLORIDE 4 MG/1
4 TABLET, FILM COATED ORAL EVERY 4 HOURS
Refills: 0 | Status: DISCONTINUED | OUTPATIENT
Start: 2024-11-21 | End: 2024-11-21

## 2024-11-21 RX ORDER — INSULIN GLARGINE 100 [IU]/ML
2 INJECTION, SOLUTION SUBCUTANEOUS ONCE
Refills: 0 | Status: DISCONTINUED | OUTPATIENT
Start: 2024-11-21 | End: 2024-11-21

## 2024-11-21 RX ORDER — HYDROMORPHONE HYDROCHLORIDE 2 MG/1
0.5 TABLET ORAL
Refills: 0 | Status: DISCONTINUED | OUTPATIENT
Start: 2024-11-21 | End: 2024-11-21

## 2024-11-21 RX ORDER — NALOXONE HCL 0.4 MG/ML
0.1 AMPUL (ML) INJECTION
Refills: 0 | Status: DISCONTINUED | OUTPATIENT
Start: 2024-11-21 | End: 2024-11-22

## 2024-11-21 RX ORDER — HYDROMORPHONE HYDROCHLORIDE 2 MG/1
30 TABLET ORAL
Refills: 0 | Status: DISCONTINUED | OUTPATIENT
Start: 2024-11-21 | End: 2024-11-22

## 2024-11-21 RX ORDER — OXYCODONE HYDROCHLORIDE 30 MG/1
5 TABLET ORAL EVERY 6 HOURS
Refills: 0 | Status: DISCONTINUED | OUTPATIENT
Start: 2024-11-21 | End: 2024-11-22

## 2024-11-21 RX ORDER — INSULIN GLARGINE 100 [IU]/ML
16 INJECTION, SOLUTION SUBCUTANEOUS AT BEDTIME
Refills: 0 | Status: DISCONTINUED | OUTPATIENT
Start: 2024-11-21 | End: 2024-11-22

## 2024-11-21 RX ORDER — ACETAMINOPHEN 500MG 500 MG/1
1000 TABLET, COATED ORAL ONCE
Refills: 0 | Status: COMPLETED | OUTPATIENT
Start: 2024-11-21 | End: 2024-11-21

## 2024-11-21 RX ORDER — ACETAMINOPHEN 500MG 500 MG/1
975 TABLET, COATED ORAL EVERY 8 HOURS
Refills: 0 | Status: DISCONTINUED | OUTPATIENT
Start: 2024-11-21 | End: 2024-11-26

## 2024-11-21 RX ORDER — ONDANSETRON HYDROCHLORIDE 4 MG/1
4 TABLET, FILM COATED ORAL EVERY 6 HOURS
Refills: 0 | Status: DISCONTINUED | OUTPATIENT
Start: 2024-11-21 | End: 2024-11-25

## 2024-11-21 RX ORDER — ACETAMINOPHEN 500MG 500 MG/1
975 TABLET, COATED ORAL ONCE
Refills: 0 | Status: DISCONTINUED | OUTPATIENT
Start: 2024-11-21 | End: 2024-11-21

## 2024-11-21 RX ORDER — PREGABALIN 75 MG/1
75 CAPSULE ORAL ONCE
Refills: 0 | Status: DISCONTINUED | OUTPATIENT
Start: 2024-11-21 | End: 2024-11-21

## 2024-11-21 RX ADMIN — Medication 10 MILLIGRAM(S): at 22:34

## 2024-11-21 RX ADMIN — HYDROMORPHONE HYDROCHLORIDE 1 MILLIGRAM(S): 2 TABLET ORAL at 18:50

## 2024-11-21 RX ADMIN — HYDROMORPHONE HYDROCHLORIDE 30 MILLILITER(S): 2 TABLET ORAL at 21:51

## 2024-11-21 RX ADMIN — TRAMADOL HYDROCHLORIDE 50 MILLIGRAM(S): 300 CAPSULE ORAL at 12:05

## 2024-11-21 RX ADMIN — INSULIN GLARGINE 16 UNIT(S): 100 INJECTION, SOLUTION SUBCUTANEOUS at 22:48

## 2024-11-21 RX ADMIN — HYDROMORPHONE HYDROCHLORIDE 0.5 MILLIGRAM(S): 2 TABLET ORAL at 18:09

## 2024-11-21 RX ADMIN — HYDROMORPHONE HYDROCHLORIDE 1 MILLIGRAM(S): 2 TABLET ORAL at 18:33

## 2024-11-21 RX ADMIN — OXYCODONE HYDROCHLORIDE 5 MILLIGRAM(S): 30 TABLET ORAL at 19:15

## 2024-11-21 RX ADMIN — Medication 100 MILLILITER(S): at 18:30

## 2024-11-21 RX ADMIN — HYDROMORPHONE HYDROCHLORIDE 0.5 MILLIGRAM(S): 2 TABLET ORAL at 18:20

## 2024-11-21 RX ADMIN — DIAZEPAM 5 MILLIGRAM(S): 10 TABLET ORAL at 19:15

## 2024-11-21 RX ADMIN — ACETAMINOPHEN 500MG 975 MILLIGRAM(S): 500 TABLET, COATED ORAL at 12:05

## 2024-11-21 RX ADMIN — HYDROMORPHONE HYDROCHLORIDE 0.5 MILLIGRAM(S): 2 TABLET ORAL at 18:10

## 2024-11-21 RX ADMIN — HYDROMORPHONE HYDROCHLORIDE 1 MILLIGRAM(S): 2 TABLET ORAL at 18:21

## 2024-11-21 RX ADMIN — HYDROMORPHONE HYDROCHLORIDE 1 MILLIGRAM(S): 2 TABLET ORAL at 19:30

## 2024-11-21 RX ADMIN — ONDANSETRON HYDROCHLORIDE 4 MILLIGRAM(S): 4 TABLET, FILM COATED ORAL at 22:43

## 2024-11-21 RX ADMIN — HYDROMORPHONE HYDROCHLORIDE 30 MILLILITER(S): 2 TABLET ORAL at 19:34

## 2024-11-21 RX ADMIN — HYDROMORPHONE HYDROCHLORIDE 0.5 MILLIGRAM(S): 2 TABLET ORAL at 17:55

## 2024-11-21 RX ADMIN — OXYCODONE HYDROCHLORIDE 5 MILLIGRAM(S): 30 TABLET ORAL at 19:45

## 2024-11-21 RX ADMIN — ACETAMINOPHEN 500MG 400 MILLIGRAM(S): 500 TABLET, COATED ORAL at 19:33

## 2024-11-21 RX ADMIN — Medication 100 MILLIGRAM(S): at 23:43

## 2024-11-21 RX ADMIN — Medication 5: at 18:30

## 2024-11-21 RX ADMIN — ACETAMINOPHEN 500MG 1000 MILLIGRAM(S): 500 TABLET, COATED ORAL at 20:00

## 2024-11-21 RX ADMIN — PREGABALIN 75 MILLIGRAM(S): 75 CAPSULE ORAL at 12:06

## 2024-11-21 RX ADMIN — HYDROMORPHONE HYDROCHLORIDE 1 MILLIGRAM(S): 2 TABLET ORAL at 20:00

## 2024-11-21 RX ADMIN — HYDROMORPHONE HYDROCHLORIDE 30 MILLILITER(S): 2 TABLET ORAL at 22:03

## 2024-11-21 RX ADMIN — Medication 3: at 22:49

## 2024-11-21 NOTE — PROGRESS NOTE ADULT - SUBJECTIVE AND OBJECTIVE BOX
Patient Resting without complaints.      Exam:   Gen: Alert/Oriented, No Acute Distress  Pulm: Non-labored breathing  BACK:          Dressing: [x] clean/dry/intact  [ ] Other:           Drains: : IGOR SS         Sensation: {x] intact to light touch  [ ] decreased:   Motor:                   C5                C6              C7               C8           T1   R            5/5                5/5            5/5             5/5          5/5  L             5/5               5/5             5/5             5/5          5/5                L2             L3             L4               L5            S1  R         5/5           5/5          5/5             5/5           5/5  L          5/5          5/5           5/5             5/5           5/5    Sensory:            C5         C6         C7      C8       T1        (0=absent, 1=impaired, 2=normal, NT=not testable)  R         2            2           2        2         2  L          2            2           2        2         2               L2          L3         L4      L5       S1         (0=absent, 1=impaired, 2=normal, NT=not testable)  R         2            2            2        2        2  L          2            2           2        2         2           WWP; capillary refill <3 seconds             Assessment and Plan:  Pt is a with 55 y/o Male  POD#0 C3-7 PSF. Patient is hemodynamically stable; recovering well from orhtopaedic standpoint.    -    Multimodal Pain control  -    DVT ppx mechanical  -    Resumed home meds  -    Check AM labs  -    Monitor IGOR output  -    Weight bearing status: WBAT  -    PT/OT  -    Dispo pending  -    C Collar when OOB

## 2024-11-21 NOTE — PATIENT PROFILE ADULT - FALL HARM RISK - HARM RISK INTERVENTIONS

## 2024-11-21 NOTE — CONSULT NOTE ADULT - ATTENDING COMMENTS
54M DM2 complicated by lower extremity amputations here for spinal surgery.  Chart reviewed (inpatient and outpatient endocrinology notes). Patient in OR at this time.  Providing insulin recommendations as outlined above.    Feng Ferrari MD  Division of Endocrinology  Pager: [phone]    If after 6PM or before 9AM, or on weekends/holidays, please call endocrine answering service for assistance ([phone]).  For nonurgent matters email [email or web address] for assistance.

## 2024-11-21 NOTE — ASU PREOP CHECKLIST - 5.
see MAR for pre op meds, valuables sent to security see MAR for pre op meds, valuables sent to security; Report from CC  @ 13:49

## 2024-11-21 NOTE — CONSULT NOTE ADULT - ASSESSMENT
55 y/o M, PMH T2DM, AFib, CAD, JOSLYN, cervical spondylosis w/myelopathy, 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 Endocrinology consulted for hyperglycemia in preop today. Pt in OR at time of consult.    #Type 2 DM  HbA1c 8.0 11/15  Fingerstick blood glucose in Preop at 266  Home regimen: Currently on 16units lantus qhs, Metformin 1000mg BID  Preliminary Recommendations:    **pending rounds in PM**       53 y/o M, PMH T2DM, AFib, CAD, JOSLYN, cervical spondylosis w/myelopathy, 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 Endocrinology consulted for hyperglycemia in preop today. Pt in OR at time of consult.    #Type 2 DM  HbA1c 8.0 11/15  Fingerstick blood glucose in Preop at 266  Home regimen: Currently on 16units lantus qhs, Metformin 1000mg BID  Preliminary Recommendations:  Inpatient: While NPO, 13 units lantus qhs, Low Admelog sliding scale q6H                When eating on Consistent carb diet: Lantus 13 units qhs, Admelog 2 units premeal TID, Low Admelog sliding scale premeal TID and qhs  Outpatient: Continue with Lantus 16 units qhs, Metformin 1000mg BID                    Follow up with outpatient Endocrinology    Eduardo Hernandez MD, PGY1    **recs are not final yet, pending rounds with attending in PM**   55 y/o M, PMH T2DM, AFib, CAD, JOSLYN, cervical spondylosis w/myelopathy, 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 Endocrinology consulted for hyperglycemia in preop today. Pt in OR at time of consult.    #Type 2 DM  HbA1c 8.0 11/15  Fingerstick blood glucose in Preop at 266  Home regimen: As per ASU documentation on 11/21, Currently on 16 units lantus qhs, Metformin 1000mg BID.   Most recent endocrine documentation reports 16 units of lantus qhs with Metformin 1000mg BID.     Preliminary Recommendations:  Inpatient: When eating on Consistent carb diet: Lantus 16 units qhs, Admelog 4 units premeal TID, Low Admelog sliding scale premeal TID and qhs                While NPO, 16 units lantus qhs, Low Admelog sliding scale q6H  Outpatient: Continue with current regimen: Lantus 16 or 18 units qhs, Metformin 1000mg BID.                    Will need to confirm current Lantus dosage.                   Follow up with outpatient Endocrinology, scheduled with Dr Lion in 1/13/2025    Eduardo Hernandez MD, PGY1

## 2024-11-21 NOTE — ASU PREOP CHECKLIST - 3.
metal plate in cervical spine metal plate in cervical spine, left BKA, wound on stump, right foot wound followed by podiatry at outpatient as per patient metal plate in cervical spine, left BKA, wound on stump, right foot wound followed by podiatry at outpatient as per patient. Scab to left arm

## 2024-11-21 NOTE — ASU PREOP CHECKLIST - 4.
took 14 units of Lantus 11/20 reduced from 18 units he normally takes, patient denies taking any coag meds

## 2024-11-21 NOTE — BRIEF OPERATIVE NOTE - NSICDXBRIEFPROCEDURE_GEN_ALL_CORE_FT
PROCEDURES:  Laminectomy, spine, cervical, posterior approach, with fusion using instrumentation 21-Nov-2024 17:20:32  Kevon Guerra

## 2024-11-21 NOTE — CONSULT NOTE ADULT - SUBJECTIVE AND OBJECTIVE BOX
Santi Nash MD   |   PGY-5  Endocrinology Fellow  Available on [employer] Teams    HPI:  HPI: 53 y/o M, PMH T2DM, AFib, CAD, JOSLYN, cervical spondylosis w/myelopathy, L BKA 3/2023, amputation of all R toes 2/2 osteomyelitis, questionable pancreatitis history 5 years ago, presented to ambulatory surgery unit for C3-5 laminectomy with C3-7 fusion operation. Pt was scheduled for this same surgery on 10/15, however had elevated blood glucose levels to 313, which only improved to 273 after insulin administration, so the surgery was canceled. He was rescheduled for today, 11/21, since he was seen by Endocrinology, given a CGM and placed on lantus nightly, and fasting blood glucose levels at home were controlled in the 100s. Endocrinology consulted for hyperglycemia. Pt in OR at time of consult.      Endocrine History:  As per outpatient Endocrine note in [facility], pt was diagnosed with DM2 in 2006, recently established care with an endocrinologist with [facility], had saw NP Jody Vargas on 10/21/24, found to have fasting blood glucose levels in the 200s when placed on CGM, he was started on lantus 14 units qhs, which was adjusted to 16 units qhs on 10/29, and was continued on Metformin 1000mg BID. His current Hb A1c is 8.0. His diet consists of approx 1 meal a day, usually a egg sandwich or pasta, and drinks 1 gallon of sweetened ice tea daily, which he was counseled on at his previous Endocrine appointments. He has a chronic R foot ulcer since 7/2024, L BKA from 2023, and amputations of all R toes from osteomyelitis in 2022. He had reported a history of pancreatitis 5 years ago, denied any family history of thyroid cancer.      PAST MEDICAL & SURGICAL HISTORY:  Type 2 diabetes mellitus      Asthma      Other spondylosis with myelopathy, cervical region      H/O osteomyelitis      Obstructive sleep apnea      Pulmonary embolism      Afib      CAD (coronary artery disease)      H/O cervical discectomy      History of left below knee amputation      Amputation of one or more toes      Right toe amputee          MEDICATIONS  (STANDING):    MEDICATIONS  (PRN):      Allergies    No Known Allergies    Intolerances      Review of Systems:  Unable to Obtain    ===================PHYSICAL EXAM=======================  VITALS: T(C): 36.5 (11-21-24 @ 10:57)  T(F): 97.7 (11-21-24 @ 10:57), Max: 97.7 (11-21-24 @ 10:57)  HR: 99 (11-21-24 @ 10:57) (99 - 99)  BP: 158/98 (11-21-24 @ 10:57) (158/98 - 158/98)  RR:  (16 - 16)  SpO2:  (98% - 98%)  Wt(kg): --      ======================================================  POCT Blood Glucose.: 266 mg/dL (11-21-24 @ 11:07)      A1C with Estimated Average Glucose Result: 8.0 % (11-15-24 @ 07:30)                 HPI:  HPI: 53 y/o M, PMH T2DM, AFib, CAD, JOSLYN, cervical spondylosis w/myelopathy, L BKA 3/2023, amputation of all R toes 2/2 osteomyelitis, questionable pancreatitis history 5 years ago, presented to ambulatory surgery unit for C3-5 laminectomy with C3-7 fusion operation. Pt was scheduled for this same surgery on 10/15, however had elevated blood glucose levels to 313, which only improved to 273 after insulin administration, so the surgery was canceled. He was rescheduled for today, 11/21, since he was seen by Endocrinology, given a CGM and placed on lantus nightly, and fasting blood glucose levels at home were controlled in the 100s. Endocrinology consulted for hyperglycemia. Pt in OR at time of consult.      Endocrine History:  As per outpatient Endocrine note in [facility], pt was diagnosed with DM2 in 2006, recently established care with an endocrinologist with [facility], had saw NP Jody Reilly Sam on 10/21/24, found to have fasting blood glucose levels in the 200s when placed on CGM, he was started on lantus 14 units qhs, which was adjusted to 16 units qhs on 10/29, and was continued on Metformin 1000mg BID. His current Hb A1c is 8.0. His diet consists of approx 1 meal a day, usually a egg sandwich or pasta, and drinks 1 gallon of sweetened ice tea daily, which he was counseled on at his previous Endocrine appointments. He has a chronic R foot ulcer since 7/2024, L BKA from 2023, and amputations of all R toes from osteomyelitis in 2022. He had reported a history of pancreatitis 5 years ago, denied any family history of thyroid cancer.      PAST MEDICAL & SURGICAL HISTORY:  Type 2 diabetes mellitus      Asthma      Other spondylosis with myelopathy, cervical region      H/O osteomyelitis      Obstructive sleep apnea      Pulmonary embolism      Afib      CAD (coronary artery disease)      H/O cervical discectomy      History of left below knee amputation      Amputation of one or more toes      Right toe amputee          MEDICATIONS  (STANDING):    MEDICATIONS  (PRN):      Allergies    No Known Allergies    Intolerances      Review of Systems:  Unable to Obtain    ===================PHYSICAL EXAM=======================  VITALS: T(C): 36.5 (11-21-24 @ 10:57)  T(F): 97.7 (11-21-24 @ 10:57), Max: 97.7 (11-21-24 @ 10:57)  HR: 99 (11-21-24 @ 10:57) (99 - 99)  BP: 158/98 (11-21-24 @ 10:57) (158/98 - 158/98)  RR:  (16 - 16)  SpO2:  (98% - 98%)  Wt(kg): --      ======================================================  POCT Blood Glucose.: 266 mg/dL (11-21-24 @ 11:07)      A1C with Estimated Average Glucose Result: 8.0 % (11-15-24 @ 07:30)

## 2024-11-22 ENCOUNTER — TRANSCRIPTION ENCOUNTER (OUTPATIENT)
Age: 54
End: 2024-11-22

## 2024-11-22 DIAGNOSIS — I10 ESSENTIAL (PRIMARY) HYPERTENSION: ICD-10-CM

## 2024-11-22 DIAGNOSIS — Z29.9 ENCOUNTER FOR PROPHYLACTIC MEASURES, UNSPECIFIED: ICD-10-CM

## 2024-11-22 DIAGNOSIS — E78.49 OTHER HYPERLIPIDEMIA: ICD-10-CM

## 2024-11-22 DIAGNOSIS — D64.9 ANEMIA, UNSPECIFIED: ICD-10-CM

## 2024-11-22 DIAGNOSIS — D72.829 ELEVATED WHITE BLOOD CELL COUNT, UNSPECIFIED: ICD-10-CM

## 2024-11-22 DIAGNOSIS — M48.02 SPINAL STENOSIS, CERVICAL REGION: ICD-10-CM

## 2024-11-22 DIAGNOSIS — I48.91 UNSPECIFIED ATRIAL FIBRILLATION: ICD-10-CM

## 2024-11-22 LAB
ANION GAP SERPL CALC-SCNC: 12 MMOL/L — SIGNIFICANT CHANGE UP (ref 7–14)
BASOPHILS # BLD AUTO: 0.01 K/UL — SIGNIFICANT CHANGE UP (ref 0–0.2)
BASOPHILS NFR BLD AUTO: 0.1 % — SIGNIFICANT CHANGE UP (ref 0–2)
BUN SERPL-MCNC: 28 MG/DL — HIGH (ref 7–23)
CALCIUM SERPL-MCNC: 9.2 MG/DL — SIGNIFICANT CHANGE UP (ref 8.4–10.5)
CHLORIDE SERPL-SCNC: 103 MMOL/L — SIGNIFICANT CHANGE UP (ref 98–107)
CO2 SERPL-SCNC: 19 MMOL/L — LOW (ref 22–31)
CREAT SERPL-MCNC: 0.9 MG/DL — SIGNIFICANT CHANGE UP (ref 0.5–1.3)
EGFR: 101 ML/MIN/1.73M2 — SIGNIFICANT CHANGE UP
EOSINOPHIL # BLD AUTO: 0 K/UL — SIGNIFICANT CHANGE UP (ref 0–0.5)
EOSINOPHIL NFR BLD AUTO: 0 % — SIGNIFICANT CHANGE UP (ref 0–6)
GLUCOSE BLDC GLUCOMTR-MCNC: 204 MG/DL — HIGH (ref 70–99)
GLUCOSE BLDC GLUCOMTR-MCNC: 211 MG/DL — HIGH (ref 70–99)
GLUCOSE BLDC GLUCOMTR-MCNC: 248 MG/DL — HIGH (ref 70–99)
GLUCOSE BLDC GLUCOMTR-MCNC: 268 MG/DL — HIGH (ref 70–99)
GLUCOSE SERPL-MCNC: 276 MG/DL — HIGH (ref 70–99)
HCT VFR BLD CALC: 36 % — LOW (ref 39–50)
HGB BLD-MCNC: 12.3 G/DL — LOW (ref 13–17)
IANC: 11.82 K/UL — HIGH (ref 1.8–7.4)
IMM GRANULOCYTES NFR BLD AUTO: 0.5 % — SIGNIFICANT CHANGE UP (ref 0–0.9)
LYMPHOCYTES # BLD AUTO: 0.48 K/UL — LOW (ref 1–3.3)
LYMPHOCYTES # BLD AUTO: 3.7 % — LOW (ref 13–44)
MCHC RBC-ENTMCNC: 29.2 PG — SIGNIFICANT CHANGE UP (ref 27–34)
MCHC RBC-ENTMCNC: 34.2 G/DL — SIGNIFICANT CHANGE UP (ref 32–36)
MCV RBC AUTO: 85.5 FL — SIGNIFICANT CHANGE UP (ref 80–100)
MONOCYTES # BLD AUTO: 0.45 K/UL — SIGNIFICANT CHANGE UP (ref 0–0.9)
MONOCYTES NFR BLD AUTO: 3.5 % — SIGNIFICANT CHANGE UP (ref 2–14)
NEUTROPHILS # BLD AUTO: 11.82 K/UL — HIGH (ref 1.8–7.4)
NEUTROPHILS NFR BLD AUTO: 92.2 % — HIGH (ref 43–77)
NRBC # BLD: 0 /100 WBCS — SIGNIFICANT CHANGE UP (ref 0–0)
NRBC # FLD: 0 K/UL — SIGNIFICANT CHANGE UP (ref 0–0)
PLATELET # BLD AUTO: 225 K/UL — SIGNIFICANT CHANGE UP (ref 150–400)
POTASSIUM SERPL-MCNC: 5.2 MMOL/L — SIGNIFICANT CHANGE UP (ref 3.5–5.3)
POTASSIUM SERPL-SCNC: 5.2 MMOL/L — SIGNIFICANT CHANGE UP (ref 3.5–5.3)
RBC # BLD: 4.21 M/UL — SIGNIFICANT CHANGE UP (ref 4.2–5.8)
RBC # FLD: 13 % — SIGNIFICANT CHANGE UP (ref 10.3–14.5)
SODIUM SERPL-SCNC: 134 MMOL/L — LOW (ref 135–145)
WBC # BLD: 12.82 K/UL — HIGH (ref 3.8–10.5)
WBC # FLD AUTO: 12.82 K/UL — HIGH (ref 3.8–10.5)

## 2024-11-22 PROCEDURE — 99232 SBSQ HOSP IP/OBS MODERATE 35: CPT

## 2024-11-22 PROCEDURE — 99223 1ST HOSP IP/OBS HIGH 75: CPT

## 2024-11-22 RX ORDER — OXYCODONE HYDROCHLORIDE 30 MG/1
15 TABLET ORAL
Refills: 0 | Status: DISCONTINUED | OUTPATIENT
Start: 2024-11-22 | End: 2024-11-26

## 2024-11-22 RX ORDER — INSULIN GLARGINE 100 [IU]/ML
20 INJECTION, SOLUTION SUBCUTANEOUS AT BEDTIME
Refills: 0 | Status: DISCONTINUED | OUTPATIENT
Start: 2024-11-22 | End: 2024-11-24

## 2024-11-22 RX ORDER — TIZANIDINE 4 MG/1
2 TABLET ORAL EVERY 6 HOURS
Refills: 0 | Status: COMPLETED | OUTPATIENT
Start: 2024-11-22 | End: 2024-11-25

## 2024-11-22 RX ORDER — OXYCODONE HYDROCHLORIDE 30 MG/1
10 TABLET ORAL
Refills: 0 | Status: DISCONTINUED | OUTPATIENT
Start: 2024-11-22 | End: 2024-11-26

## 2024-11-22 RX ORDER — HYDROMORPHONE HYDROCHLORIDE 2 MG/1
0.5 TABLET ORAL
Refills: 0 | Status: DISCONTINUED | OUTPATIENT
Start: 2024-11-22 | End: 2024-11-25

## 2024-11-22 RX ADMIN — Medication 3: at 07:30

## 2024-11-22 RX ADMIN — Medication 10 MILLIGRAM(S): at 06:03

## 2024-11-22 RX ADMIN — ACETAMINOPHEN 500MG 975 MILLIGRAM(S): 500 TABLET, COATED ORAL at 13:19

## 2024-11-22 RX ADMIN — Medication 100 MILLIGRAM(S): at 07:30

## 2024-11-22 RX ADMIN — OXYCODONE HYDROCHLORIDE 15 MILLIGRAM(S): 30 TABLET ORAL at 15:00

## 2024-11-22 RX ADMIN — HYDROMORPHONE HYDROCHLORIDE 30 MILLILITER(S): 2 TABLET ORAL at 08:30

## 2024-11-22 RX ADMIN — INSULIN GLARGINE 20 UNIT(S): 100 INJECTION, SOLUTION SUBCUTANEOUS at 21:38

## 2024-11-22 RX ADMIN — Medication 2: at 11:38

## 2024-11-22 RX ADMIN — HYDROMORPHONE HYDROCHLORIDE 0.5 MILLIGRAM(S): 2 TABLET ORAL at 06:04

## 2024-11-22 RX ADMIN — ACETAMINOPHEN 500MG 975 MILLIGRAM(S): 500 TABLET, COATED ORAL at 14:00

## 2024-11-22 RX ADMIN — ACETAMINOPHEN 500MG 975 MILLIGRAM(S): 500 TABLET, COATED ORAL at 07:03

## 2024-11-22 RX ADMIN — Medication 6 UNIT(S): at 11:55

## 2024-11-22 RX ADMIN — OXYCODONE HYDROCHLORIDE 15 MILLIGRAM(S): 30 TABLET ORAL at 17:32

## 2024-11-22 RX ADMIN — ACETAMINOPHEN 500MG 975 MILLIGRAM(S): 500 TABLET, COATED ORAL at 06:03

## 2024-11-22 RX ADMIN — Medication 4 UNIT(S): at 07:30

## 2024-11-22 RX ADMIN — PANTOPRAZOLE SODIUM 40 MILLIGRAM(S): 40 TABLET, DELAYED RELEASE ORAL at 06:03

## 2024-11-22 RX ADMIN — Medication 6 UNIT(S): at 17:34

## 2024-11-22 RX ADMIN — OXYCODONE HYDROCHLORIDE 15 MILLIGRAM(S): 30 TABLET ORAL at 20:28

## 2024-11-22 RX ADMIN — OXYCODONE HYDROCHLORIDE 15 MILLIGRAM(S): 30 TABLET ORAL at 23:35

## 2024-11-22 RX ADMIN — OXYCODONE HYDROCHLORIDE 15 MILLIGRAM(S): 30 TABLET ORAL at 18:32

## 2024-11-22 RX ADMIN — TIZANIDINE 2 MILLIGRAM(S): 4 TABLET ORAL at 18:41

## 2024-11-22 RX ADMIN — ACETAMINOPHEN 500MG 975 MILLIGRAM(S): 500 TABLET, COATED ORAL at 21:38

## 2024-11-22 RX ADMIN — OXYCODONE HYDROCHLORIDE 15 MILLIGRAM(S): 30 TABLET ORAL at 21:28

## 2024-11-22 RX ADMIN — Medication 4: at 17:34

## 2024-11-22 RX ADMIN — OXYCODONE HYDROCHLORIDE 15 MILLIGRAM(S): 30 TABLET ORAL at 14:15

## 2024-11-22 RX ADMIN — ACETAMINOPHEN 500MG 975 MILLIGRAM(S): 500 TABLET, COATED ORAL at 22:38

## 2024-11-22 RX ADMIN — ACETAMINOPHEN 500MG 975 MILLIGRAM(S): 500 TABLET, COATED ORAL at 02:25

## 2024-11-22 RX ADMIN — ACETAMINOPHEN 500MG 975 MILLIGRAM(S): 500 TABLET, COATED ORAL at 01:25

## 2024-11-22 NOTE — OCCUPATIONAL THERAPY INITIAL EVALUATION ADULT - MD ORDER
Occupational Therapy (OT) to evaluate and treat. Ambulate as Tolerated. Per ELBA Lino pt is okay to participate in OT evaluation and perform activity as tolerated.

## 2024-11-22 NOTE — DISCHARGE NOTE PROVIDER - NSDCFUADDAPPT_GEN_ALL_CORE_FT
Schedule follow up appointment with your endocrinologist in 2 weeks for ozempic prescription update/refill

## 2024-11-22 NOTE — PROGRESS NOTE ADULT - SUBJECTIVE AND OBJECTIVE BOX
Chief Complaint: T2DM    Interval Events: Pt seen and examined at bedside. Pt tolerating carb consistent diet with no nausea, no vomiting.    MEDICATIONS  (STANDING):  acetaminophen     Tablet .. 975 milliGRAM(s) Oral every 8 hours  dextrose 5%. 1000 milliLiter(s) (100 mL/Hr) IV Continuous <Continuous>  dextrose 5%. 1000 milliLiter(s) (50 mL/Hr) IV Continuous <Continuous>  dextrose 50% Injectable 25 Gram(s) IV Push once  dextrose 50% Injectable 12.5 Gram(s) IV Push once  dextrose 50% Injectable 25 Gram(s) IV Push once  glucagon  Injectable 1 milliGRAM(s) IntraMuscular once  insulin glargine Injectable (LANTUS) 20 Unit(s) SubCutaneous at bedtime  insulin lispro (ADMELOG) corrective regimen sliding scale   SubCutaneous three times a day before meals  insulin lispro (ADMELOG) corrective regimen sliding scale   SubCutaneous at bedtime  insulin lispro Injectable (ADMELOG) 6 Unit(s) SubCutaneous three times a day before meals  pantoprazole    Tablet 40 milliGRAM(s) Oral before breakfast  polyethylene glycol 3350 17 Gram(s) Oral daily  senna 2 Tablet(s) Oral at bedtime  tiZANidine 2 milliGRAM(s) Oral every 6 hours    MEDICATIONS  (PRN):  bisacodyl 5 milliGRAM(s) Oral every 12 hours PRN Constipation  dextrose Oral Gel 15 Gram(s) Oral once PRN Blood Glucose LESS THAN 70 milliGRAM(s)/deciliter  HYDROmorphone  Injectable 0.5 milliGRAM(s) IV Push every 3 hours PRN Severe Breakthrough  Pain (7 - 10)  magnesium hydroxide Suspension 30 milliLiter(s) Oral every 12 hours PRN Constipation  ondansetron Injectable 4 milliGRAM(s) IV Push every 6 hours PRN Nausea  ondansetron Injectable 4 milliGRAM(s) IV Push every 6 hours PRN Nausea and/or Vomiting  oxyCODONE    IR 10 milliGRAM(s) Oral every 3 hours PRN Moderate Pain (4 - 6)  oxyCODONE    IR 15 milliGRAM(s) Oral every 3 hours PRN Severe Pain (7 - 10)      Allergies    No Known Allergies    Intolerances      Review of Systems:  Eyes: No blurry vision  Cardiovascular: No chest pain, palpitations  Respiratory: No SOB, no cough  GI: No nausea, vomiting, abdominal pain  : No dysuria  Psych: no depression  Endocrine: no polyuria, polydipsia    ALL OTHER SYSTEMS REVIEWED AND NEGATIVE    VITALS: T(C): 36.7 (11-22-24 @ 09:40)  T(F): 98 (11-22-24 @ 09:40), Max: 98 (11-22-24 @ 09:40)  HR: 94 (11-22-24 @ 09:40) (89 - 102)  BP: 151/77 (11-22-24 @ 09:40) (133/78 - 181/89)  RR:  (10 - 18)  SpO2:  (92% - 99%)  Wt(kg): --      Physical Exam:   GENERAL: NAD, well-developed  EYES: No proptosis  HEENT:  Atraumatic, Normocephalic  RESPIRATORY: non labored breathing   GI: Non distended  SKIN: Dry, intact, No rashes or lesions  PSYCH: Alert, normal affect, normal mood    CAPILLARY BLOOD GLUCOSE    POCT Blood Glucose.: 248 mg/dL (22 Nov 2024 11:05)  POCT Blood Glucose.: 268 mg/dL (22 Nov 2024 07:26)  POCT Blood Glucose.: 357 mg/dL (21 Nov 2024 22:35)  POCT Blood Glucose.: 386 mg/dL (21 Nov 2024 21:22)  POCT Blood Glucose.: 368 mg/dL (21 Nov 2024 18:26)      11-22    134[L]  |  103  |  28[H]  ----------------------------<  276[H]  5.2   |  19[L]  |  0.90    eGFR: 101    Ca    9.2      11-22        A1C with Estimated Average Glucose Result: 8.0 % (11-15-24 @ 07:30)      Thyroid Function Tests:

## 2024-11-22 NOTE — CONSULT NOTE ADULT - PROBLEM SELECTOR RECOMMENDATION 9
s/p C3-7 PSF - OR 11/21  - post op care and pain control per ortho  - encouraged incentive spirometer use  - PT evaluation  - DVT ppx: SCDs

## 2024-11-22 NOTE — OCCUPATIONAL THERAPY INITIAL EVALUATION ADULT - LEVEL OF CONSCIOUSNESS, OT EVAL
Noted pt. disrespectful, condescending, agitated, and name-calling therapists "circus clowns" during session. Pt. educated on benefits of session, however, also educated on pt.'s rights and can stop/end session at any point. Pt. hyperactive and becoming argumentive to continue therapy session otherwise "I am uncooperative." ELBA Lino bedside. Orthopedic TERENCE Patel made aware./alert/agitated

## 2024-11-22 NOTE — OCCUPATIONAL THERAPY INITIAL EVALUATION ADULT - IMPAIRMENTS CONTRIBUTING IMPAIRED BED MOBILITY, REHAB EVAL
Noted pt. with increased agitation after sitting at edge of bed secondary to lines, stating "this is ridiculous." RN available and present to disconnect appropraite lines. Pt continuing to be verbally disrespectful. Pt re-educated we can end session and return to bed if preferred. Pt. irritated by suggestion and argumentaive to continue. OT and PT available for supervision for continued safety and assessment. RN bedside.

## 2024-11-22 NOTE — CONSULT NOTE ADULT - ASSESSMENT
54M, poor historian, with hx of Afib (not on AC), ?CAD, ?CHF, T2DM (A1c 8% on insulin), cervical spine stenosis admitted for C3-7 PSF - OR 11/21. 54M, with hx of PNA ([facility], March 2024 - course c/b Afib and CHF - no longer active issues, not on any medications, follows with cardiology Dr. Keo Alamo, recent EF 65% and back in sinus rhythm), T2DM (A1c 8% on insulin), cervical spine stenosis admitted for C3-7 PSF - OR 11/21.

## 2024-11-22 NOTE — PHYSICAL THERAPY INITIAL EVALUATION ADULT - ADDITIONAL COMMENTS
pt has a commode, wheelchair; pt reports he ambulates independent without device with use of prosthesis.

## 2024-11-22 NOTE — CONSULT NOTE ADULT - PROBLEM SELECTOR RECOMMENDATION 4
rate controlled  - not on any home medications - rate control agents or AC  - continue to monitor currently in sinus rhythm, was a complication of sepsis/PNA when hospitalized at [facility] in March 2024, no longer on any medications (rate control agents or AC)  - follows with cardiologist Dr. Keo Alamo, recent EKG in SR, TTE with EF 65% no c/f HF  - outpatient cardiology followup

## 2024-11-22 NOTE — PROGRESS NOTE ADULT - SUBJECTIVE AND OBJECTIVE BOX
Anesthesia Pain Management Service    SUBJECTIVE: Patient reports pain located on his back and neck. States the IV PCA helps for a short time. Reports falling asleep and waking up in pain. Patient reports taking Oxycodone for several years. Dose prior to admission was Oxycodone 5mg one to 2 times a day as needed. Reports taking Oxycodone 10mg 6 times a day prior to that prescribed by his PCP. States he was then refered to a pain management doctor who prescribed him Buprenorphine. States he did not like the way Buprenorphine made him feel and stopped taking it. Patient states he gets prescriptions for Oxycodone from Dr. Arvizu currently. Patient states he had bilateral PE's after taking Lyrica. Gabapentin gave him " short term memory loss". Patient was also trying gummies until August but stopped since it was not working for his pain. Reports good pain relief with Tizanidine in combination with Oxycodone. Patient reports phantom pain from the left BKA. Denies anxiety and depression.   Pain Scale Score	At rest: _7/10__ 	With Activity: ___ 	[X ] Refer to charted pain scores    THERAPY:    [ ] IV PCA Morphine		[ ] 5 mg/mL	[ ] 1 mg/mL  [X ] IV PCA Hydromorphone	[ ] 5 mg/mL	[X ] 1 mg/mL  [ ] IV PCA Fentanyl		[ ] 50 micrograms/mL    Demand dose __0.2_ lockout __6_ (minutes) Continuous Rate _0__ Total: _12.8__   mg used (in past 24 hrs)      MEDICATIONS  (STANDING):  acetaminophen     Tablet .. 975 milliGRAM(s) Oral every 8 hours  dextrose 5%. 1000 milliLiter(s) (100 mL/Hr) IV Continuous <Continuous>  dextrose 5%. 1000 milliLiter(s) (50 mL/Hr) IV Continuous <Continuous>  dextrose 50% Injectable 25 Gram(s) IV Push once  dextrose 50% Injectable 12.5 Gram(s) IV Push once  dextrose 50% Injectable 25 Gram(s) IV Push once  glucagon  Injectable 1 milliGRAM(s) IntraMuscular once  insulin glargine Injectable (LANTUS) 20 Unit(s) SubCutaneous at bedtime  insulin lispro (ADMELOG) corrective regimen sliding scale   SubCutaneous three times a day before meals  insulin lispro (ADMELOG) corrective regimen sliding scale   SubCutaneous at bedtime  insulin lispro Injectable (ADMELOG) 6 Unit(s) SubCutaneous three times a day before meals  pantoprazole    Tablet 40 milliGRAM(s) Oral before breakfast  polyethylene glycol 3350 17 Gram(s) Oral daily  senna 2 Tablet(s) Oral at bedtime  tiZANidine 2 milliGRAM(s) Oral every 6 hours    MEDICATIONS  (PRN):  bisacodyl 5 milliGRAM(s) Oral every 12 hours PRN Constipation  dextrose Oral Gel 15 Gram(s) Oral once PRN Blood Glucose LESS THAN 70 milliGRAM(s)/deciliter  HYDROmorphone  Injectable 0.5 milliGRAM(s) IV Push every 3 hours PRN Severe Breakthrough  Pain (7 - 10)  magnesium hydroxide Suspension 30 milliLiter(s) Oral every 12 hours PRN Constipation  ondansetron Injectable 4 milliGRAM(s) IV Push every 6 hours PRN Nausea  ondansetron Injectable 4 milliGRAM(s) IV Push every 6 hours PRN Nausea and/or Vomiting  oxyCODONE    IR 10 milliGRAM(s) Oral every 3 hours PRN Moderate Pain (4 - 6)  oxyCODONE    IR 15 milliGRAM(s) Oral every 3 hours PRN Severe Pain (7 - 10)      OBJECTIVE: Patient sitting up in bed.    Sedation Score:	[ X] Alert	[ ] Drowsy 	[ ] Arousable	[ ] Asleep	[ ] Unresponsive    Side Effects:	[X ] None	[ ] Nausea	[ ] Vomiting	[ ] Pruritus  		[ ] Other:    Vital Signs Last 24 Hrs  T(C): 36.7 (22 Nov 2024 09:40), Max: 36.7 (22 Nov 2024 09:40)  T(F): 98 (22 Nov 2024 09:40), Max: 98 (22 Nov 2024 09:40)  HR: 94 (22 Nov 2024 09:40) (89 - 102)  BP: 151/77 (22 Nov 2024 09:40) (133/78 - 181/89)  BP(mean): 94 (21 Nov 2024 21:00) (94 - 115)  RR: 16 (22 Nov 2024 09:40) (10 - 18)  SpO2: 98% (22 Nov 2024 09:40) (92% - 99%)    Parameters below as of 22 Nov 2024 09:40  Patient On (Oxygen Delivery Method): room air        ASSESSMENT/ PLAN    Therapy to  be:	[ ] Continue   [ X] Discontinued   [X ] Change to prn Analgesics    Documentation and Verification of current medications:   [X] Done	[ ] Not done, not elligible    Comments: IV PCA discontinued. Recommend weaning off IV Dilaudid as tolerated by patient prior to discharge. Recommend follow up with a chronic pain management MD outpatient upon discharge. Patient may call his insurance carrier for a list of providers who accepts his insurance. PRN Oral/IV opioids and/or Adjuvant non-opioid medication to be ordered at this point.  I STOP Reviewed and found:  Oxycodone hcl ir 5mg tablets, quantity of 30 pills for 5 days. Last dispensed on 11/19/24.  Progress Note written now but Patient was seen earlier.

## 2024-11-22 NOTE — PROGRESS NOTE ADULT - ASSESSMENT
55 y/o M, PMH T2DM, AFib, CAD, JOSLYN, cervical spondylosis w/myelopathy, 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 Endocrinology consulted for hyperglycemia pre-operatively.       #Poorly controlled T2DM with hyperglycemia  - HbA1c: 8.0  - Home Regimen: Lantus 20 units (pt reports he increased it himself to 20 units) and Metformin 1000mg PO BID  - Endocrinologist: Dr. Lion and NP Radha  PLAN  - Goal -180 mg/gl: am and prandial FS above goal.   - Agree with increase Lantus 20 units at bedtime. DO NOT HOLD IF NPO.  - Agree with increase Admelog 6 units TID pre-meal. HOLD IF NPO.  - Continue moderate Admelog correctional scale TID AC  - Continue separate moderate Admelog correctional scale at HS  - Fingerstick BG before meals and bedtime  - Goal -180  - Carbohydrate consistent diet  - RD consult    Discharge plan:  - Pt agreeable to GLP-1, pt reports a concern for pancreatitis in the past while at [facility], admission was related to increased INR from Coumadin and pancreatitis was ruled out.   - Discharge medications: Lantus ___ units at bedtime, Ozempic 0.25mg SQ once weekly x 4 weeks, increase to 0.5mg SQ once weekly x 4 weeks, Metformin 1000mg PO PO BID.  - Please send Rx for Ozempic 0.25mg SQ once weekly x 4 weeks to determine cost  - Patient to call doctor with persistent high or low BG at home.   - Ensure patient has glucometer, test strips and lancets on discharge.  - Follow up with outpatient Endocrinology, scheduled with Dr Lion in 1/13/2025: [facility] at [address] [place] [address] phone number # [phone].  - Recommend routine outpatient ophthalmology, podiatry f/u    #HTN  - Can check urine albumin/creatinine outpatient  - Outpatient goal BP <130/80. Management per primary team.    #HLD  - Goal LDL < 70 mg/dl  - Can check lipid profile if not done recently  - Would likely benefit from a statin if no contraindication    STEWART Tran-BC  Nurse Practitioner  Division of Endocrinology  Contact on TEAMS    If out of hospital/unavailable when paged, please note: patient will be cared for by another provider on the endocrine service.  For urgent concerns: call the endocrine answering service for assistance to reach covering provider ([phone]). For non-urgent matters: please email [email or web address] for assistance.         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 Endocrinology consulted for hyperglycemia pre-operatively.       #Poorly controlled T2DM with hyperglycemia  - HbA1c: 8.0  - Home Regimen: Lantus 20 units (pt reports he increased it himself to 20 units) and Metformin 1000mg PO BID  - Endocrinologist: Dr. Lion and NP Radha  PLAN  - Goal -180 mg/gl: am and prandial FS above goal.   - Agree with increase Lantus 20 units at bedtime. DO NOT HOLD IF NPO.  - Agree with increase Admelog 6 units TID pre-meal. HOLD IF NPO.  - Continue moderate Admelog correctional scale TID AC  - Continue separate moderate Admelog correctional scale at HS  - Fingerstick BG before meals and bedtime  - Goal -180  - Carbohydrate consistent diet  - RD consult    Discharge plan:  - Pt agreeable to GLP-1, pt reports a concern for pancreatitis in the past while at [facility], admission was related to increased INR from Coumadin and pancreatitis was ruled out.   - Discharge medications: Lantus ___ units at bedtime, Ozempic 0.25mg SQ once weekly x 4 weeks, increase to 0.5mg SQ once weekly x 4 weeks, Metformin 1000mg PO PO BID.  - Please send Rx for Ozempic 0.25mg SQ once weekly x 4 weeks to determine cost  - Patient to call doctor with persistent high or low BG at home.   - Ensure patient has glucometer, test strips and lancets on discharge.  - Follow up with outpatient Endocrinology, scheduled with Dr Lion in 1/13/2025: [facility] at [address] [place] [address] phone number # [phone].  - Recommend routine outpatient ophthalmology, podiatry f/u    #HTN  - Can check urine albumin/creatinine outpatient annually  - Outpatient goal BP <130/80. Management per primary team.    #HLD  - Goal LDL < 70 mg/dl  - Can check lipid profile if not done recently  - Would likely benefit from a statin if no contraindication    STEWART Tran-BC  Nurse Practitioner  Division of Endocrinology  Contact on TEAMS    If out of hospital/unavailable when paged, please note: patient will be cared for by another provider on the endocrine service.  For urgent concerns: call the endocrine answering service for assistance to reach covering provider ([phone]). For non-urgent matters: please email [email or web address] for assistance.

## 2024-11-22 NOTE — CONSULT NOTE ADULT - PROBLEM SELECTOR RECOMMENDATION 2
Types: Cigarettes    Smokeless tobacco: Never Used      Comment: quit a few days ago    Alcohol use No    Drug use: No    Sexual activity: Not Asked     Other Topics Concern    None     Social History Narrative    Works at [employer]. Lives in [place]. Place of Residence : with family:  spouse      Family History:   Family History   Problem Relation Age of Onset   Mora Dakins Cancer Mother         breast cancer    Cancer Father         prostate cancer    Diabetes Father          REVIEW OF SYSTEMS:    CONSTITUTIONAL:  Denies fever, chill or rigors, nausea or vomiting, +anorexia, +fatigue  HEENT: denies blurring of vision or double vision, denies hearing problem  RESPIRATORY: denies cough, shortness of breath, sputum expectoration, chest pain. CARDIOVASCULAR:  Denies palpitation  GASTROINTESTINAL:  Denies abdominal pain, diarrhea or constipation. GENITOURINARY:  Denies burning urination or frequency of urination  INTEGUMENT: denies wound, rash  HEMATOLOGIC/LYMPHATIC:  Denies lymph node swelling, gum bleeding or easy bruising.   MUSCULOSKELETAL:  Denies leg pain, joint pain, joint swelling  NEUROLOGICAL:  Denies light headed, dizziness, loss of consciousness, +weakness          PHYSICAL EXAM:     Vitals:    /72   Pulse 82   Temp 98.2 °F (36.8 °C) (Oral)   Resp 18   Ht 5' 11\" (1.803 m)   Wt 180 lb (81.6 kg)   SpO2 96%   BMI 25.10 kg/m²     Gen: NAD, awake, alert   HEENT: normocephalic, atraumatic, PERRLA, EOMI, trach/oxygen   Neck: no LAD, no JVD,   Lungs: Bilaterally clear to auscultation, diminished b/l bases    Heart: regular rate and rhythm, distant heart tones, no murmurs/rubs/gallops appreciated   Abdomen: normoactive bowel sounds, soft, non-tender, non-distended   Extremities: no clubbing, cyanosis or edema   Skin: warm, dry   Neuro: awake, alert, oriented x 3      DATA:      CBC with Differential:    Lab Results   Component Value Date    WBC 7.9 09/19/2018    RBC 3.85 09/19/2018 WBC 12.8  - likely reactive in post op state  - no focal s/s of infection, observe off abx   - trend fever curve

## 2024-11-22 NOTE — DISCHARGE NOTE PROVIDER - HOSPITAL COURSE
This is a 55yo Male with PMH of CAD, Afib, Asthma, JOSLYN, PE, DM2 who presents to [facility] for orthopedic surgery. Patient s/p C3-5 laminectomy, C3-7 fusion with Dr. Arvizu on 11/21/24. Patient tolerated the procedure well without any intraoperative complications. Patient tolerated physical therapy, weight bearing as tolerated and pain was controlled. Seen by medical attending for continuity of care and management and cleared for safe discharge. As per surgeon, the patient is stable and ready for discharge. DO NOT take any NSAIDS (motrin, advil, ibuprofen, aleve), Aspirin, Anti-inflammatory medications unless instructed by your orthopedic surgeon to continue. Avoid any heavy lifting, bending, squatting, or twisting motions. Keep dressing/incision clean, dry and intact, may remove dressing two days after discharge and leave incision open to air. Any sutures/staples to be removed on post-op day #14 at your office visit. Please follow up with Dr. Arvizu in 2 weeks, call the office to make an appointment, [phone]. Please follow up with your PMD for continuity of care and management as medications may have changed.   This is a 55yo Male with PMH of CAD, Afib, Asthma, JOSLYN, PE, DM2 who presents to [facility] for orthopedic surgery. Patient s/p C3-5 laminectomy, C3-7 fusion with Dr. Arvizu on 11/21/24. Patient tolerated the procedure well without any intraoperative complications. Patient tolerated physical therapy, weight bearing as tolerated and pain was controlled. Seen by medical attending for continuity of care and management and cleared for safe discharge. As per surgeon, the patient is stable and ready for discharge. To keep on C-collar when out of bed. DO NOT take any NSAIDS (motrin, advil, ibuprofen, aleve), Aspirin, Anti-inflammatory medications unless instructed by your orthopedic surgeon to continue. Avoid any heavy lifting, bending, squatting, or twisting motions. Keep dressing/incision clean, dry and intact, may remove dressing two days after discharge and leave incision open to air. Any sutures/staples to be removed on post-op day #14 at your office visit. Please follow up with Dr. Arvizu in 2 weeks, call the office to make an appointment, [phone]. Please follow up with your PMD for continuity of care and management as medications may have changed.

## 2024-11-22 NOTE — DISCHARGE NOTE PROVIDER - NSDCMRMEDTOKEN_GEN_ALL_CORE_FT
Lantus 100 units/mL subcutaneous solution: 18 unit(s) subcutaneous once a day (at bedtime)  metFORMIN 1000 mg oral tablet: 1 tab(s) orally 2 times a day  oxyCODONE 5 mg oral tablet: 1-2 tabs orally every 4 hours as needed   acetaminophen 325 mg oral tablet: 3 tab(s) orally every 8 hours  Dexcom G7 : Use as directed  Dexcom G7 Sensors: Change every 10 days  insulin glargine 100 units/mL subcutaneous solution: 24 unit(s) subcutaneous once a day (at bedtime)  metFORMIN 1000 mg oral tablet: 1 tab(s) orally 2 times a day  oxyCODONE 10 mg oral tablet: 1 tab(s) orally every 4 hours MDD: 6  oxyCODONE 5 mg oral tablet: 1-2 tabs orally every 4 hours as needed  pantoprazole 40 mg oral delayed release tablet: 1 tab(s) orally once a day (before a meal)  polyethylene glycol 3350 oral powder for reconstitution: 17 gram(s) orally once a day  semaglutide 0.25 mg/0.5 mL (0.25 mg dose) subcutaneous solution: 0.25 milligram(s) subcutaneously once a week  senna leaf extract oral tablet: 2 tab(s) orally once a day (at bedtime)   acetaminophen 325 mg oral tablet: 3 tab(s) orally every 8 hours  Basaglar KwikPen 100 units/mL subcutaneous solution: 28 unit(s) subcutaneous once a day  insulin glargine 100 units/mL subcutaneous solution: 24 unit(s) subcutaneous once a day (at bedtime)  metFORMIN 1000 mg oral tablet: 1 tab(s) orally 2 times a day  Narcan 4 mg/0.1 mL nasal spray: 1 spray(s) intranasally once a day  oxyCODONE 10 mg oral tablet: 1 tab(s) orally every 4 hours MDD: 6  oxyCODONE 5 mg oral tablet: 1-2 tabs orally every 4 hours as needed  pantoprazole 40 mg oral delayed release tablet: 1 tab(s) orally once a day (before a meal)  polyethylene glycol 3350 oral powder for reconstitution: 17 gram(s) orally once a day  senna leaf extract oral tablet: 2 tab(s) orally once a day (at bedtime)   acetaminophen 325 mg oral tablet: 3 tab(s) orally every 8 hours  Basaglar KwikPen 100 units/mL subcutaneous solution: 28 unit(s) subcutaneous once a day  metFORMIN 1000 mg oral tablet: 1 tab(s) orally 2 times a day  Narcan 4 mg/0.1 mL nasal spray: 1 spray(s) intranasally once a day  oxyCODONE 10 mg oral tablet: 1 tab(s) orally every 4 hours MDD: 6  oxyCODONE 5 mg oral tablet: 1-2 tabs orally every 4 hours as needed  pantoprazole 40 mg oral delayed release tablet: 1 tab(s) orally once a day (before a meal)  polyethylene glycol 3350 oral powder for reconstitution: 17 gram(s) orally once a day  senna leaf extract oral tablet: 2 tab(s) orally once a day (at bedtime)

## 2024-11-22 NOTE — DISCHARGE NOTE PROVIDER - NSDCCPCAREPLAN_GEN_ALL_CORE_FT
PRINCIPAL DISCHARGE DIAGNOSIS  Diagnosis: Stenosis of cervical spine  Assessment and Plan of Treatment: Other Care:  -You may shower 72 hours after surgery but DO NOT soak dressing and/or incision. The water may run over your dressing/incision but DO NOT let the water directly hit your dressing/incision (take a shower with your wound away from the direct stream of water).  NO hot tubes, NO bath tubs, NO swimming pools.          Pain control:         -Acetaminophen 500mg - 2 tabs every 8 hours  As needed:        -Flexeril 5mg - 1 tab three times a day - Take as needed for musle spasms        -Tramadol 50mg - 1 tab every 6 hours - Take only if needed for MODERATE pain       -Oxycodone 5mg - 1 tab every 4-6 hours - Take only if needed for SEVERE or BREAKTHROUGH pain  Oxycodone and Tramadol have been sent to your pharmacy. Please do not drive, operate machinery, or make important decisions while taking these medications.   Other Medications: (Standing)  -Protonix 40mg - 1 tab every 24 hours - to prevent stomach irritation/ulcers  -Senna 8.6mg - 2 pills every 24 hours - stool softener  -Miralax 17g - daily - constipation   Follow up: Please follow up at your prescheduled post-operative follow up appointment with Dr. Arvizu for 2 weeks after hospital discharge. Please call with any questions or concerns including fevers/chills, worsening pain, pus from the wounds, redness of the skin, new or worsening trouble when you swallow, worsening hoarsness or trouble speaking, difficulty breathing or heaviness in the chest at [phone].   Please seek immediate care or call 911 if:   -Your bandage begins to soak with blood  -Your surgical wound breaks open  -You have severe back or leg pain  -You cannot control your bladder or bowel movements  -You have a high fever with nausea and vomiting  -You have painful swelling in your neck AND trouble swallowing  -You have new or worsening trouble moving your neck, arms, or legs  Please also follow up with your PCP within 1 week to approve resuming home medications.     PRINCIPAL DISCHARGE DIAGNOSIS  Diagnosis: Stenosis of cervical spine  Assessment and Plan of Treatment: Other Care:  -You may shower 72 hours after surgery but DO NOT soak dressing and/or incision. The water may run over your dressing/incision but DO NOT let the water directly hit your dressing/incision (take a shower with your wound away from the direct stream of water).  NO hot tubes, NO bath tubs, NO swimming pools.          Pain control:         -Acetaminophen 500mg - 2 tabs every 8 hours  As needed:        -Oxycodone 10mg - 1 tab every 4-6 hours - Take only if needed for SEVERE or BREAKTHROUGH pain  Oxycodone has been sent to your pharmacy. Please do not drive, operate machinery, or make important decisions while taking these medications.   Other Medications: (Standing)  -Protonix 40mg - 1 tab every 24 hours - to prevent stomach irritation/ulcers  -Senna 8.6mg - 2 pills every 24 hours - stool softener  -Miralax 17g - daily - constipation   Follow up: Please follow up at your prescheduled post-operative follow up appointment with Dr. Arvizu for 2 weeks after hospital discharge. Please call with any questions or concerns including fevers/chills, worsening pain, pus from the wounds, redness of the skin, new or worsening trouble when you swallow, worsening hoarsness or trouble speaking, difficulty breathing or heaviness in the chest at [phone].   Please seek immediate care or call 911 if:   -Your bandage begins to soak with blood  -Your surgical wound breaks open  -You have severe back or leg pain  -You cannot control your bladder or bowel movements  -You have a high fever with nausea and vomiting  -You have painful swelling in your neck AND trouble swallowing  -You have new or worsening trouble moving your neck, arms, or legs  Please also follow up with your PCP within 1 week to approve resuming home medications.

## 2024-11-22 NOTE — PHYSICAL THERAPY INITIAL EVALUATION ADULT - GENERAL OBSERVATIONS, REHAB EVAL
Pt seen lying in bed, +alves, IV PCA, +left BKA, prosthesis at bedside, c-collar at bedside. Pt states " I don't need physical therapy."  With encouragement and education pt was willing to participate in evaluation however pt was agitated, condescending and disrespectful to therapist and nurse during evaluation, not wanting to wear c-collar or be guarded during transfers/ambulation assessment despite instructions on safety and spinal precautions, TERENCE Patel made aware via TEAMS. Pt seen lying in bed, +alves, IV PCA, +left BKA, prosthesis at bedside, c-collar at bedside; HR 89. Pt states " I don't need physical therapy."  With encouragement and education pt was willing to participate in evaluation however pt was agitated, condescending and disrespectful to therapist and nurse during evaluation, not wanting to wear c-collar or be guarded during transfers/ambulation assessment despite instructions on safety and spinal precautions, TERENCE Patel made aware via TEAMS.

## 2024-11-22 NOTE — CONSULT NOTE ADULT - PROBLEM SELECTOR RECOMMENDATION 5
A1C 8%  - home regimen: Lantus 18U qhs  - FS elevated, increase to Lantus 20U qhs + Admelog 4U TID qac  - FS/SSI qac and hs - adjust basal/bolus regimen as needed A1C 8%  - home regimen: Lantus 18U qhs + metformin  - FS elevated, increase to Lantus 20U qhs + Admelog 6U TID qac  - FS/SSI qac and hs - adjust basal/bolus regimen as needed A1C 8%  - home regimen: Lantus 18U qhs + metformin  - FS elevated, increase to Lantus 20U qhs + Admelog 6U TID qac  - endocrine following - further basal/bolus adjustments per endocrine

## 2024-11-22 NOTE — PROGRESS NOTE ADULT - SUBJECTIVE AND OBJECTIVE BOX
Pt seen/examined. Doing well. Pain controlled. No acute overnight complaints or events.    T(C): 36.6 (11-22-24 @ 05:11), Max: 36.6 (11-21-24 @ 18:00)  HR: 89 (11-22-24 @ 05:11) (89 - 102)  BP: 153/74 (11-22-24 @ 05:11) (133/78 - 181/89)  RR: 16 (11-22-24 @ 05:11) (10 - 18)  SpO2: 98% (11-22-24 @ 05:11) (92% - 99%)  Wt(kg): --  - Gen: NAD    Exam:   Gen: Alert/Oriented, No Acute Distress  Pulm: Non-labored breathing  BACK:          Dressing: [x] clean/dry/intact  [ ] Other:           Drains: : IGOR SS         Sensation: {x] intact to light touch  [ ] decreased:   Motor:                   C5                C6              C7               C8           T1   R            5/5                5/5            5/5             5/5          5/5  L             5/5               5/5             5/5             5/5          5/5                L2             L3             L4               L5            S1  R         5/5           5/5          5/5             5/5           5/5  L          5/5          5/5           5/5             5/5           5/5    Sensory:            C5         C6         C7      C8       T1        (0=absent, 1=impaired, 2=normal, NT=not testable)  R         2            2           2        2         2  L          2            2           2        2         2               L2          L3         L4      L5       S1         (0=absent, 1=impaired, 2=normal, NT=not testable)  R         2            2            2        2        2  L          2            2           2        2         2           WWP; capillary refill <3 seconds             Assessment and Plan:  Pt is a with 53 y/o Male  POD#1 C3-7 PSF. Patient is hemodynamically stable; recovering well from orhtopaedic standpoint.    -    Multimodal Pain control  -    DVT ppx mechanical  -    Resumed home meds  -    Check AM labs  -    Monitor IGOR output  -    Weight bearing status: WBAT  -    PT/OT  -    Dispo pending  -    C Collar when OOB  -    Appreciate endo recs   Pt seen/examined. Doing well. Pain controlled. No acute overnight complaints or events.    T(C): 36.6 (11-22-24 @ 05:11), Max: 36.6 (11-21-24 @ 18:00)  HR: 89 (11-22-24 @ 05:11) (89 - 102)  BP: 153/74 (11-22-24 @ 05:11) (133/78 - 181/89)  RR: 16 (11-22-24 @ 05:11) (10 - 18)  SpO2: 98% (11-22-24 @ 05:11) (92% - 99%)  Wt(kg): --  - Gen: NAD    Exam:   Gen: Alert/Oriented, No Acute Distress  Pulm: Non-labored breathing  BACK:          Dressing: [x] clean/dry/intact  [ ] Other:           Drains: : IGOR SS         Sensation: {x] intact to light touch  [ ] decreased:   Motor:                   C5                C6              C7               C8           T1   R            5/5                5/5            5/5             5/5          5/5  L             5/5               5/5             5/5             5/5          5/5                L2             L3             L4               L5            S1  R         5/5           5/5          5/5             5/5           5/5  L          5/5          5/5           5/5             5/5           5/5    Sensory:            C5         C6         C7      C8       T1        (0=absent, 1=impaired, 2=normal, NT=not testable)  R         2            2           2        2         2  L          2            2           2        2         2               L2          L3         L4      L5       S1         (0=absent, 1=impaired, 2=normal, NT=not testable)  R         2            2            2        2        2  L          2            2           2        2         2           WWP; capillary refill <3 seconds             Assessment and Plan:  Pt is a with 53 y/o Male  POD#1 C3-7 PSF. Patient is hemodynamically stable; recovering well from orhtopaedic standpoint.    -    Multimodal Pain control  -    DVT ppx mechanical  -    Resumed home meds  -    Check AM labs  -    Monitor IGOR output  -    Weight bearing status: WBAT  -    PT/OT  -    Dispo pending  -    C Collar when OOB  -    Appreciate endo recs      Patient seen and examined agree with above. Feels hands have im proved partially. Transition from PCA to orals.  Begin PT. C-collar on when OOB

## 2024-11-22 NOTE — CONSULT NOTE ADULT - SUBJECTIVE AND OBJECTIVE BOX
Carmen Quiles MD  [facility] Division of Hospital Medicine  Pager [phone] (M-F 8AM-5PM)  Other Times: [phone]    Patient is a 54y old  Male who presents with a chief complaint of Cervical Spine Laminectomy and Fusion (21 Nov 2024 15:48)    HPI: 54M, poor historian, with hx of Afib (not on AC), ?CAD, ?CHF, T2DM (A1c 8% on insulin), cervical spine stenosis admitted for C3-7 PSF - OR 11/21. No acute events overnight, recovering well.       Review of Systems:   CONSTITUTIONAL: No fever, no fatigue  EYES: No eye pain or discharge  ENMT:  No sinus or throat pain  NECK: No pain or stiffness  RESPIRATORY: No cough, wheezing, chills or hemoptysis; No shortness of breath  CARDIOVASCULAR: No chest pain, palpitations, dizziness, or leg swelling  GASTROINTESTINAL: No abdominal or epigastric pain. No nausea, vomiting, or hematemesis; No diarrhea or constipation. No melena or hematochezia.  GENITOURINARY: No dysuria or incontinence  MUSCULOSKELETAL: No joint pain or swelling; No muscle, back, or extremity pain  NEUROLOGICAL: No headaches, memory loss, loss of strength, numbness, or tremors  PSYCHIATRIC: No depression, anxiety, mood swings, or difficulty sleeping  SKIN: No rashes, no skin changes    PAST MEDICAL & SURGICAL HISTORY:  Type 2 diabetes mellitus      Asthma      Other spondylosis with myelopathy, cervical region      H/O osteomyelitis      Obstructive sleep apnea      Pulmonary embolism      Afib      CAD (coronary artery disease)      H/O cervical discectomy      History of left below knee amputation      Amputation of one or more toes      Right toe amputee          FAMILY HISTORY:  FH: type 2 diabetes (Father, Grandparent)    Family hx of lung cancer (Mother)    FHx: heart disease (Grandparent)    Family history of liver cancer (Grandparent)        SOCIAL HISTORY:     Allergies    No Known Allergies    Intolerances        Home Medications:  Lantus 100 units/mL subcutaneous solution: 18 unit(s) subcutaneous once a day (at bedtime) (15 Nov 2024 07:24)  metFORMIN 1000 mg oral tablet: 1 tab(s) orally 2 times a day (15 Nov 2024 07:24)  oxyCODONE 5 mg oral tablet: 1-2 tabs orally every 4 hours as needed (15 Nov 2024 07:24)      MEDICATIONS  (STANDING):  acetaminophen     Tablet .. 975 milliGRAM(s) Oral every 8 hours  cyclobenzaprine 10 milliGRAM(s) Oral every 8 hours  dextrose 5%. 1000 milliLiter(s) (100 mL/Hr) IV Continuous <Continuous>  dextrose 5%. 1000 milliLiter(s) (50 mL/Hr) IV Continuous <Continuous>  dextrose 50% Injectable 25 Gram(s) IV Push once  dextrose 50% Injectable 12.5 Gram(s) IV Push once  dextrose 50% Injectable 25 Gram(s) IV Push once  glucagon  Injectable 1 milliGRAM(s) IntraMuscular once  HYDROmorphone PCA (1 mG/mL) 30 milliLiter(s) PCA Continuous PCA Continuous  insulin glargine Injectable (LANTUS) 20 Unit(s) SubCutaneous at bedtime  insulin lispro (ADMELOG) corrective regimen sliding scale   SubCutaneous three times a day before meals  insulin lispro (ADMELOG) corrective regimen sliding scale   SubCutaneous at bedtime  insulin lispro Injectable (ADMELOG) 4 Unit(s) SubCutaneous three times a day before meals  lactated ringers. 1000 milliLiter(s) (100 mL/Hr) IV Continuous <Continuous>  pantoprazole    Tablet 40 milliGRAM(s) Oral before breakfast  polyethylene glycol 3350 17 Gram(s) Oral daily  senna 2 Tablet(s) Oral at bedtime    MEDICATIONS  (PRN):  bisacodyl 5 milliGRAM(s) Oral every 12 hours PRN Constipation  dextrose Oral Gel 15 Gram(s) Oral once PRN Blood Glucose LESS THAN 70 milliGRAM(s)/deciliter  HYDROmorphone PCA (1 mG/mL) Rescue Clinician Bolus 0.5 milliGRAM(s) IV Push every 15 minutes PRN Severe Pain (7 - 10)  magnesium hydroxide Suspension 30 milliLiter(s) Oral every 12 hours PRN Constipation  naloxone Injectable 0.1 milliGRAM(s) IV Push every 3 minutes PRN For ANY of the following changes in patient status:  A. RR LESS THAN 10 breaths per minute, B. Oxygen saturation LESS THAN 90%, C. Sedation score of 6  ondansetron Injectable 4 milliGRAM(s) IV Push every 6 hours PRN Nausea  ondansetron Injectable 4 milliGRAM(s) IV Push every 6 hours PRN Nausea and/or Vomiting  oxyCODONE    IR 5 milliGRAM(s) Oral every 6 hours PRN Moderate Pain (4 - 6)  traMADol 50 milliGRAM(s) Oral every 6 hours PRN Mild Pain (1 - 3)      PHYSICAL EXAM:  Vital Signs Last 24 Hrs  T(C): 36.7 (22 Nov 2024 09:40), Max: 36.7 (22 Nov 2024 09:40)  T(F): 98 (22 Nov 2024 09:40), Max: 98 (22 Nov 2024 09:40)  HR: 94 (22 Nov 2024 09:40) (89 - 102)  BP: 151/77 (22 Nov 2024 09:40) (133/78 - 181/89)  BP(mean): 94 (21 Nov 2024 21:00) (94 - 115)  RR: 16 (22 Nov 2024 09:40) (10 - 18)  SpO2: 98% (22 Nov 2024 09:40) (92% - 99%)    Parameters below as of 22 Nov 2024 09:40  Patient On (Oxygen Delivery Method): room air        CONSTITUTIONAL: resting in bed comfortably   EYES: no conjunctival or scleral injection, non-icteric, PERRLA  ENMT: no external nasal lesions, oral mucosa with moist membranes  NECK: trachea midline, no palpable neck mass   RESPIRATORY: breathing comfortably; lungs CTA without wheeze/rales/rhonchi  CARDIOVASCULAR: regular rate and rhythm; +S1S2, no murmurs, rubs, or gallops, no lower extremity edema, 2+ peripheral pulses  GASTROINTESTINAL: soft, nontender, nondistended; +BS throughout, no rebound/guarding  MUSCULOSKELETAL: no joint effusions, normal strength and tone of extremities   NEUROLOGIC: non-focal, sensation intact to light touch in b/l upper and lower extremities   PSYCHIATRIC: AAOx3, appropriate mood and affect  SKIN: no rashes or lesions, warm, surgical site c/d/i    LABS:                        12.3   12.82 )-----------( 225      ( 22 Nov 2024 06:44 )             36.0     11-22    134[L]  |  103  |  28[H]  ----------------------------<  276[H]  5.2   |  19[L]  |  0.90    Ca    9.2      22 Nov 2024 06:44            Urinalysis Basic - ( 22 Nov 2024 06:44 )    Color: x / Appearance: x / SG: x / pH: x  Gluc: 276 mg/dL / Ketone: x  / Bili: x / Urobili: x   Blood: x / Protein: x / Nitrite: x   Leuk Esterase: x / RBC: x / WBC x   Sq Epi: x / Non Sq Epi: x / Bacteria: x        RADIOLOGY & ADDITIONAL TESTS:    EKG Personally Reviewed:    Imaging Personally Reviewed:    Care Discussed with Consultants/Other Providers:   Carmen Quiles MD  [facility] Division of Hospital Medicine  Pager [phone] (M-F 8AM-5PM)  Other Times: [phone]    Patient is a 54y old  Male who presents with a chief complaint of Cervical Spine Laminectomy and Fusion (21 Nov 2024 15:48)    HPI: 54M, with hx of PNA (March 2024 [facility] course c/b Afib and CHF - no longer active issues, not on any medications, follows with cardiology Dr. Keo Alamo, recent EF 65% and back in sinus rhythm), T2DM (A1c 8% on insulin), cervical spine stenosis admitted for C3-7 PSF - OR 11/21. No acute events overnight, recovering well.       Review of Systems:   CONSTITUTIONAL: No fever, no fatigue  EYES: No eye pain or discharge  ENMT:  No sinus or throat pain  NECK: No pain or stiffness  RESPIRATORY: No cough, wheezing, chills or hemoptysis; No shortness of breath  CARDIOVASCULAR: No chest pain, palpitations, dizziness, or leg swelling  GASTROINTESTINAL: No abdominal or epigastric pain. No nausea, vomiting, or hematemesis; No diarrhea or constipation. No melena or hematochezia.  GENITOURINARY: No dysuria or incontinence  MUSCULOSKELETAL: No joint pain or swelling; No muscle, back, or extremity pain  NEUROLOGICAL: No headaches, memory loss, loss of strength, numbness, or tremors  PSYCHIATRIC: No depression, anxiety, mood swings, or difficulty sleeping  SKIN: No rashes, no skin changes    PAST MEDICAL & SURGICAL HISTORY:  Type 2 diabetes mellitus      Asthma      Other spondylosis with myelopathy, cervical region      H/O osteomyelitis      Obstructive sleep apnea      Pulmonary embolism      Afib      CAD (coronary artery disease)      H/O cervical discectomy      History of left below knee amputation      Amputation of one or more toes      Right toe amputee          FAMILY HISTORY:  FH: type 2 diabetes (Father, Grandparent)    Family hx of lung cancer (Mother)    FHx: heart disease (Grandparent)    Family history of liver cancer (Grandparent)        SOCIAL HISTORY:     Allergies    No Known Allergies    Intolerances        Home Medications:  Lantus 100 units/mL subcutaneous solution: 18 unit(s) subcutaneous once a day (at bedtime) (15 Nov 2024 07:24)  metFORMIN 1000 mg oral tablet: 1 tab(s) orally 2 times a day (15 Nov 2024 07:24)  oxyCODONE 5 mg oral tablet: 1-2 tabs orally every 4 hours as needed (15 Nov 2024 07:24)      MEDICATIONS  (STANDING):  acetaminophen     Tablet .. 975 milliGRAM(s) Oral every 8 hours  cyclobenzaprine 10 milliGRAM(s) Oral every 8 hours  dextrose 5%. 1000 milliLiter(s) (100 mL/Hr) IV Continuous <Continuous>  dextrose 5%. 1000 milliLiter(s) (50 mL/Hr) IV Continuous <Continuous>  dextrose 50% Injectable 25 Gram(s) IV Push once  dextrose 50% Injectable 12.5 Gram(s) IV Push once  dextrose 50% Injectable 25 Gram(s) IV Push once  glucagon  Injectable 1 milliGRAM(s) IntraMuscular once  HYDROmorphone PCA (1 mG/mL) 30 milliLiter(s) PCA Continuous PCA Continuous  insulin glargine Injectable (LANTUS) 20 Unit(s) SubCutaneous at bedtime  insulin lispro (ADMELOG) corrective regimen sliding scale   SubCutaneous three times a day before meals  insulin lispro (ADMELOG) corrective regimen sliding scale   SubCutaneous at bedtime  insulin lispro Injectable (ADMELOG) 4 Unit(s) SubCutaneous three times a day before meals  lactated ringers. 1000 milliLiter(s) (100 mL/Hr) IV Continuous <Continuous>  pantoprazole    Tablet 40 milliGRAM(s) Oral before breakfast  polyethylene glycol 3350 17 Gram(s) Oral daily  senna 2 Tablet(s) Oral at bedtime    MEDICATIONS  (PRN):  bisacodyl 5 milliGRAM(s) Oral every 12 hours PRN Constipation  dextrose Oral Gel 15 Gram(s) Oral once PRN Blood Glucose LESS THAN 70 milliGRAM(s)/deciliter  HYDROmorphone PCA (1 mG/mL) Rescue Clinician Bolus 0.5 milliGRAM(s) IV Push every 15 minutes PRN Severe Pain (7 - 10)  magnesium hydroxide Suspension 30 milliLiter(s) Oral every 12 hours PRN Constipation  naloxone Injectable 0.1 milliGRAM(s) IV Push every 3 minutes PRN For ANY of the following changes in patient status:  A. RR LESS THAN 10 breaths per minute, B. Oxygen saturation LESS THAN 90%, C. Sedation score of 6  ondansetron Injectable 4 milliGRAM(s) IV Push every 6 hours PRN Nausea  ondansetron Injectable 4 milliGRAM(s) IV Push every 6 hours PRN Nausea and/or Vomiting  oxyCODONE    IR 5 milliGRAM(s) Oral every 6 hours PRN Moderate Pain (4 - 6)  traMADol 50 milliGRAM(s) Oral every 6 hours PRN Mild Pain (1 - 3)      PHYSICAL EXAM:  Vital Signs Last 24 Hrs  T(C): 36.7 (22 Nov 2024 09:40), Max: 36.7 (22 Nov 2024 09:40)  T(F): 98 (22 Nov 2024 09:40), Max: 98 (22 Nov 2024 09:40)  HR: 94 (22 Nov 2024 09:40) (89 - 102)  BP: 151/77 (22 Nov 2024 09:40) (133/78 - 181/89)  BP(mean): 94 (21 Nov 2024 21:00) (94 - 115)  RR: 16 (22 Nov 2024 09:40) (10 - 18)  SpO2: 98% (22 Nov 2024 09:40) (92% - 99%)    Parameters below as of 22 Nov 2024 09:40  Patient On (Oxygen Delivery Method): room air        CONSTITUTIONAL: resting in bed comfortably   EYES: no conjunctival or scleral injection, non-icteric, PERRLA  ENMT: no external nasal lesions, oral mucosa with moist membranes  NECK: trachea midline, no palpable neck mass   RESPIRATORY: breathing comfortably; lungs CTA without wheeze/rales/rhonchi  CARDIOVASCULAR: regular rate and rhythm; +S1S2, no murmurs, rubs, or gallops, no lower extremity edema, 2+ peripheral pulses  GASTROINTESTINAL: soft, nontender, nondistended; +BS throughout, no rebound/guarding  MUSCULOSKELETAL: no joint effusions, normal strength and tone of extremities   NEUROLOGIC: non-focal, sensation intact to light touch in b/l upper and lower extremities   PSYCHIATRIC: AAOx3, appropriate mood and affect  SKIN: no rashes or lesions, warm, surgical site c/d/i    LABS:                        12.3   12.82 )-----------( 225      ( 22 Nov 2024 06:44 )             36.0     11-22    134[L]  |  103  |  28[H]  ----------------------------<  276[H]  5.2   |  19[L]  |  0.90    Ca    9.2      22 Nov 2024 06:44            Urinalysis Basic - ( 22 Nov 2024 06:44 )    Color: x / Appearance: x / SG: x / pH: x  Gluc: 276 mg/dL / Ketone: x  / Bili: x / Urobili: x   Blood: x / Protein: x / Nitrite: x   Leuk Esterase: x / RBC: x / WBC x   Sq Epi: x / Non Sq Epi: x / Bacteria: x        RADIOLOGY & ADDITIONAL TESTS:    EKG Personally Reviewed:    Imaging Personally Reviewed:    Care Discussed with Consultants/Other Providers:

## 2024-11-22 NOTE — DISCHARGE NOTE PROVIDER - NSDCCPTREATMENT_GEN_ALL_CORE_FT
PRINCIPAL PROCEDURE  Procedure: Laminectomy, spine, cervical, posterior approach, with fusion using instrumentation  Findings and Treatment: IMPORTANT: *DO NOT resume any anticoagulation/blood thinners (Aspirin, Plavix, Eliquis, Coumadin, Xarelto, ect.) until instructed by your surgeon.*  *DO NOT take any NSAIDs (Motrin, Aleve, Advil, Ibuprofen, Celebrex, ect.) until instructed by  your surgeon.*  Diet: Continue regular diet upon discharge.   Activity: No heavy lifting. Avoid straining or excessive activity. DO NOT sit for long periods of time.   -DO NOT bend, twist, or lift heavy objects for at least 3 months.   -DO NOT drive until cleared by your surgeon.   -To reduce strain/pressure on your spine place a pillow under your knees when lying on your back. Place a pillow between your knees when lying on your side.   -When you sit put your feet up on a foot rest. DO NOT lie on your stomach or with your legs flat.  -If you need to bend over, bend at your knees, not your back.  -We encourage you to take many short walks after your surgery to help blood move through your body and to prevent blood clots from forming. If you feel weak or dizzy, sit or lie down right away.   Dressings: Keep dressing clean, dry, and intact. Remove all cotton and yellow gauze 72 hours after surgery. You do not need to put a new dressing on your wound unless there is light drainage. If that occurs place Band-Aids on your wound.

## 2024-11-22 NOTE — DISCHARGE NOTE PROVIDER - CARE PROVIDER_API CALL
Carlos Arvizu  Orthopaedic Surgery  [address] [place] [id number]  Phone: [phone]  Fax: [phone]  Follow Up Time:

## 2024-11-22 NOTE — PHYSICAL THERAPY INITIAL EVALUATION ADULT - LIVES WITH, PROFILE
alone in 2 story home however pt stays on main level of home alone in 2 story home however pt stays on main level of home, 4 steps to enter

## 2024-11-22 NOTE — OCCUPATIONAL THERAPY INITIAL EVALUATION ADULT - LIVES WITH, PROFILE
Pt. reports he lives alone in a house with 4 steps to enter. Once inside, pt. reports he has a full flight of steps to negotiate to reach 2nd floor where main bedroom and bathroom are located. Per pt., he has a bathtub in his bathroom. Pt. reports he plans to stay on the first floor post-hospital discharge. Pt. states he owns a 3:1 commode to use as needed.

## 2024-11-22 NOTE — PROGRESS NOTE ADULT - SUBJECTIVE AND OBJECTIVE BOX
ANESTHESIA POSTOP CHECK    54y Male POSTOP DAY 1     No COMPLAINTS    NO APPARENT ANESTHESIA COMPLICATIONS

## 2024-11-22 NOTE — DISCHARGE NOTE PROVIDER - NSDCFUSCHEDAPPT_GEN_ALL_CORE_FT
Carlos Arvizu  [facility]  [address] [facility]  Scheduled Appointment: 12/11/2024    Mary Lion  [facility]  [address] [facility]  Scheduled Appointment: 01/13/2025     Carlos Arvizu  [facility]  [address] [facility]  Scheduled Appointment: 12/11/2024    Mary Lion  [facility]  [address]  Scheduled Appointment: 01/13/2025    [facility]  [address]  Scheduled Appointment: 01/22/2025

## 2024-11-22 NOTE — OCCUPATIONAL THERAPY INITIAL EVALUATION ADULT - GENERAL OBSERVATIONS, REHAB EVAL
Pt. received semisupine in bed. No acute distress. Pt initially stating "I don't need physical and occupational therapy." However, patient agreed to evaluation from Occupational Therapist after education and encouragement. +IV, +Urethral Catheter, +PCA pump, +Clean dry intact dressing to Neck. HR: 89bpm. ELBA Lino bedside. Pt also initally refusing to wear cervical collar, however, pt agreeable with ELBA Lino and RN donned accordingly for session.

## 2024-11-23 LAB
ANION GAP SERPL CALC-SCNC: 12 MMOL/L — SIGNIFICANT CHANGE UP (ref 7–14)
BASOPHILS # BLD AUTO: 0.01 K/UL — SIGNIFICANT CHANGE UP (ref 0–0.2)
BASOPHILS NFR BLD AUTO: 0.1 % — SIGNIFICANT CHANGE UP (ref 0–2)
BUN SERPL-MCNC: 22 MG/DL — SIGNIFICANT CHANGE UP (ref 7–23)
CALCIUM SERPL-MCNC: 8.9 MG/DL — SIGNIFICANT CHANGE UP (ref 8.4–10.5)
CHLORIDE SERPL-SCNC: 102 MMOL/L — SIGNIFICANT CHANGE UP (ref 98–107)
CO2 SERPL-SCNC: 22 MMOL/L — SIGNIFICANT CHANGE UP (ref 22–31)
CREAT SERPL-MCNC: 0.82 MG/DL — SIGNIFICANT CHANGE UP (ref 0.5–1.3)
EGFR: 104 ML/MIN/1.73M2 — SIGNIFICANT CHANGE UP
EOSINOPHIL # BLD AUTO: 0.13 K/UL — SIGNIFICANT CHANGE UP (ref 0–0.5)
EOSINOPHIL NFR BLD AUTO: 1.6 % — SIGNIFICANT CHANGE UP (ref 0–6)
GLUCOSE BLDC GLUCOMTR-MCNC: 178 MG/DL — HIGH (ref 70–99)
GLUCOSE BLDC GLUCOMTR-MCNC: 191 MG/DL — HIGH (ref 70–99)
GLUCOSE BLDC GLUCOMTR-MCNC: 229 MG/DL — HIGH (ref 70–99)
GLUCOSE BLDC GLUCOMTR-MCNC: 237 MG/DL — HIGH (ref 70–99)
GLUCOSE SERPL-MCNC: 161 MG/DL — HIGH (ref 70–99)
HCT VFR BLD CALC: 32.6 % — LOW (ref 39–50)
HGB BLD-MCNC: 11 G/DL — LOW (ref 13–17)
IANC: 6.28 K/UL — SIGNIFICANT CHANGE UP (ref 1.8–7.4)
IMM GRANULOCYTES NFR BLD AUTO: 0.2 % — SIGNIFICANT CHANGE UP (ref 0–0.9)
LYMPHOCYTES # BLD AUTO: 1.12 K/UL — SIGNIFICANT CHANGE UP (ref 1–3.3)
LYMPHOCYTES # BLD AUTO: 13.7 % — SIGNIFICANT CHANGE UP (ref 13–44)
MCHC RBC-ENTMCNC: 29.2 PG — SIGNIFICANT CHANGE UP (ref 27–34)
MCHC RBC-ENTMCNC: 33.7 G/DL — SIGNIFICANT CHANGE UP (ref 32–36)
MCV RBC AUTO: 86.5 FL — SIGNIFICANT CHANGE UP (ref 80–100)
MONOCYTES # BLD AUTO: 0.59 K/UL — SIGNIFICANT CHANGE UP (ref 0–0.9)
MONOCYTES NFR BLD AUTO: 7.2 % — SIGNIFICANT CHANGE UP (ref 2–14)
NEUTROPHILS # BLD AUTO: 6.28 K/UL — SIGNIFICANT CHANGE UP (ref 1.8–7.4)
NEUTROPHILS NFR BLD AUTO: 77.2 % — HIGH (ref 43–77)
NRBC # BLD: 0 /100 WBCS — SIGNIFICANT CHANGE UP (ref 0–0)
NRBC # FLD: 0 K/UL — SIGNIFICANT CHANGE UP (ref 0–0)
PLATELET # BLD AUTO: 165 K/UL — SIGNIFICANT CHANGE UP (ref 150–400)
POTASSIUM SERPL-MCNC: 4.3 MMOL/L — SIGNIFICANT CHANGE UP (ref 3.5–5.3)
POTASSIUM SERPL-SCNC: 4.3 MMOL/L — SIGNIFICANT CHANGE UP (ref 3.5–5.3)
RBC # BLD: 3.77 M/UL — LOW (ref 4.2–5.8)
RBC # FLD: 13.2 % — SIGNIFICANT CHANGE UP (ref 10.3–14.5)
SODIUM SERPL-SCNC: 136 MMOL/L — SIGNIFICANT CHANGE UP (ref 135–145)
WBC # BLD: 8.15 K/UL — SIGNIFICANT CHANGE UP (ref 3.8–10.5)
WBC # FLD AUTO: 8.15 K/UL — SIGNIFICANT CHANGE UP (ref 3.8–10.5)

## 2024-11-23 PROCEDURE — 99232 SBSQ HOSP IP/OBS MODERATE 35: CPT

## 2024-11-23 RX ORDER — LOSARTAN POTASSIUM 100 MG/1
100 TABLET, FILM COATED ORAL DAILY
Refills: 0 | Status: DISCONTINUED | OUTPATIENT
Start: 2024-11-23 | End: 2024-11-26

## 2024-11-23 RX ADMIN — ACETAMINOPHEN 500MG 975 MILLIGRAM(S): 500 TABLET, COATED ORAL at 06:43

## 2024-11-23 RX ADMIN — HYDROMORPHONE HYDROCHLORIDE 0.5 MILLIGRAM(S): 2 TABLET ORAL at 10:04

## 2024-11-23 RX ADMIN — Medication 4: at 11:06

## 2024-11-23 RX ADMIN — OXYCODONE HYDROCHLORIDE 15 MILLIGRAM(S): 30 TABLET ORAL at 14:05

## 2024-11-23 RX ADMIN — OXYCODONE HYDROCHLORIDE 15 MILLIGRAM(S): 30 TABLET ORAL at 00:35

## 2024-11-23 RX ADMIN — OXYCODONE HYDROCHLORIDE 15 MILLIGRAM(S): 30 TABLET ORAL at 04:42

## 2024-11-23 RX ADMIN — HYDROMORPHONE HYDROCHLORIDE 0.5 MILLIGRAM(S): 2 TABLET ORAL at 21:25

## 2024-11-23 RX ADMIN — HYDROMORPHONE HYDROCHLORIDE 0.5 MILLIGRAM(S): 2 TABLET ORAL at 16:11

## 2024-11-23 RX ADMIN — HYDROMORPHONE HYDROCHLORIDE 0.5 MILLIGRAM(S): 2 TABLET ORAL at 01:50

## 2024-11-23 RX ADMIN — TIZANIDINE 2 MILLIGRAM(S): 4 TABLET ORAL at 23:47

## 2024-11-23 RX ADMIN — Medication 6 UNIT(S): at 07:46

## 2024-11-23 RX ADMIN — TIZANIDINE 2 MILLIGRAM(S): 4 TABLET ORAL at 18:26

## 2024-11-23 RX ADMIN — OXYCODONE HYDROCHLORIDE 15 MILLIGRAM(S): 30 TABLET ORAL at 18:07

## 2024-11-23 RX ADMIN — OXYCODONE HYDROCHLORIDE 15 MILLIGRAM(S): 30 TABLET ORAL at 15:05

## 2024-11-23 RX ADMIN — Medication 6 UNIT(S): at 11:05

## 2024-11-23 RX ADMIN — TIZANIDINE 2 MILLIGRAM(S): 4 TABLET ORAL at 12:07

## 2024-11-23 RX ADMIN — OXYCODONE HYDROCHLORIDE 15 MILLIGRAM(S): 30 TABLET ORAL at 06:43

## 2024-11-23 RX ADMIN — LOSARTAN POTASSIUM 100 MILLIGRAM(S): 100 TABLET, FILM COATED ORAL at 14:37

## 2024-11-23 RX ADMIN — OXYCODONE HYDROCHLORIDE 15 MILLIGRAM(S): 30 TABLET ORAL at 12:05

## 2024-11-23 RX ADMIN — TIZANIDINE 2 MILLIGRAM(S): 4 TABLET ORAL at 07:47

## 2024-11-23 RX ADMIN — HYDROMORPHONE HYDROCHLORIDE 0.5 MILLIGRAM(S): 2 TABLET ORAL at 02:50

## 2024-11-23 RX ADMIN — OXYCODONE HYDROCHLORIDE 15 MILLIGRAM(S): 30 TABLET ORAL at 23:29

## 2024-11-23 RX ADMIN — OXYCODONE HYDROCHLORIDE 15 MILLIGRAM(S): 30 TABLET ORAL at 11:05

## 2024-11-23 RX ADMIN — Medication 2: at 07:47

## 2024-11-23 RX ADMIN — ACETAMINOPHEN 500MG 975 MILLIGRAM(S): 500 TABLET, COATED ORAL at 15:05

## 2024-11-23 RX ADMIN — ACETAMINOPHEN 500MG 975 MILLIGRAM(S): 500 TABLET, COATED ORAL at 07:43

## 2024-11-23 RX ADMIN — ACETAMINOPHEN 500MG 975 MILLIGRAM(S): 500 TABLET, COATED ORAL at 22:17

## 2024-11-23 RX ADMIN — ACETAMINOPHEN 500MG 975 MILLIGRAM(S): 500 TABLET, COATED ORAL at 21:17

## 2024-11-23 RX ADMIN — OXYCODONE HYDROCHLORIDE 15 MILLIGRAM(S): 30 TABLET ORAL at 17:07

## 2024-11-23 RX ADMIN — OXYCODONE HYDROCHLORIDE 15 MILLIGRAM(S): 30 TABLET ORAL at 03:42

## 2024-11-23 RX ADMIN — TIZANIDINE 2 MILLIGRAM(S): 4 TABLET ORAL at 00:54

## 2024-11-23 RX ADMIN — INSULIN GLARGINE 20 UNIT(S): 100 INJECTION, SOLUTION SUBCUTANEOUS at 21:18

## 2024-11-23 RX ADMIN — ACETAMINOPHEN 500MG 975 MILLIGRAM(S): 500 TABLET, COATED ORAL at 14:05

## 2024-11-23 RX ADMIN — HYDROMORPHONE HYDROCHLORIDE 0.5 MILLIGRAM(S): 2 TABLET ORAL at 09:04

## 2024-11-23 RX ADMIN — OXYCODONE HYDROCHLORIDE 15 MILLIGRAM(S): 30 TABLET ORAL at 07:43

## 2024-11-23 RX ADMIN — Medication 2: at 16:31

## 2024-11-23 RX ADMIN — HYDROMORPHONE HYDROCHLORIDE 0.5 MILLIGRAM(S): 2 TABLET ORAL at 15:11

## 2024-11-23 RX ADMIN — OXYCODONE HYDROCHLORIDE 15 MILLIGRAM(S): 30 TABLET ORAL at 22:29

## 2024-11-23 RX ADMIN — PANTOPRAZOLE SODIUM 40 MILLIGRAM(S): 40 TABLET, DELAYED RELEASE ORAL at 06:43

## 2024-11-23 RX ADMIN — Medication 6 UNIT(S): at 16:31

## 2024-11-23 RX ADMIN — HYDROMORPHONE HYDROCHLORIDE 0.5 MILLIGRAM(S): 2 TABLET ORAL at 20:25

## 2024-11-23 NOTE — PROGRESS NOTE ADULT - PROBLEM SELECTOR PLAN 5
currently in sinus rhythm, was a complication of sepsis/PNA when hospitalized at [facility] in March 2024, no longer on any medications (rate control agents or AC)  - follows with cardiologist Dr. Keo Alamo, recent EKG in SR, TTE with EF 65% no c/f HF  - outpatient cardiology followup. A1C 8%  - home regimen: Lantus 18U qhs + metformin  - endocrine following - basal/bolus adjustments per endocrine  - On dc resume basal insulin and metformin and add Ozempic per endocrine recs    - Check lipid panel with next labs  - Start rosuvastatin 20 mg daily if no contraindication

## 2024-11-23 NOTE — PROGRESS NOTE ADULT - SUBJECTIVE AND OBJECTIVE BOX
Orthopedic Surgery      Pt seen and examined. Pt denies any overnight events. Pt reports endorses neck pain, but medication is helping. Pt denies any fever/chills/chest pain/nausea/vomiting. Pt reports ambulating with assistance.     ICU Vital Signs Last 24 Hrs  T(C): 36.7 (23 Nov 2024 06:38), Max: 36.7 (22 Nov 2024 09:40)  T(F): 98 (23 Nov 2024 06:38), Max: 98 (22 Nov 2024 09:40)  HR: 85 (23 Nov 2024 06:38) (83 - 100)  BP: 182/88 (23 Nov 2024 06:38) (143/98 - 182/88)  BP(mean): --  ABP: --  ABP(mean): --  RR: 18 (23 Nov 2024 06:38) (16 - 18)  SpO2: 98% (23 Nov 2024 06:38) (97% - 100%)          Physical exam:   Gen: No acute distress  Resp: Breathing comfortably on room air  Drain with serosanguineous fluid in bulb, no leakage.   Motor exam:          Upper extremity         C5 (Shoulder Abd)    C6 (Elbow flex)   C7 (Elbow ext)   C8 (Finger flex) T1 (finger abd)         R         5/5                 5/5                       5/5                      5/5                         5/5          L          5/5                 5/5                      5/5                      5/5                         5/5                   Lower extremity           L2 (Hip flex)  L3 (knee ext)  L4 (Dorsi flex)  L5 (EHL)  S1 (Plantar flex)                                               R        5/5            5/5             5/5                    5/5          5/5      L        5/5            5/5             5/5                   5/5           5/5      Sensory exam:                        C5      C6      C7      C8       T1          RIGHT          2         2        2         2         2          (0=absent, 1=impaired, 2=normal, NT=not testable)  LEFT             2         2        2         2         2          (0=absent, 1=impaired, 2=normal, NT=not testable)                          L2      L3     L4     L5       S1          RIGHT          2         2        2         2         2          (0=absent, 1=impaired, 2=normal, NT=not testable)  LEFT             2         2        2         2         2          (0=absent, 1=impaired, 2=normal, NT=not testable)     LABS:                        11.0   8.15  )-----------( 165      ( 23 Nov 2024 05:45 )             32.6     11-22    134[L]  |  103  |  28[H]  ----------------------------<  276[H]  5.2   |  19[L]  |  0.90    Ca    9.2      22 Nov 2024 06:44        Urinalysis Basic - ( 22 Nov 2024 06:44 )    Color: x / Appearance: x / SG: x / pH: x  Gluc: 276 mg/dL / Ketone: x  / Bili: x / Urobili: x   Blood: x / Protein: x / Nitrite: x   Leuk Esterase: x / RBC: x / WBC x   Sq Epi: x / Non Sq Epi: x / Bacteria: x              Assessment/Plan:   Pt is a with 55 y/o Male s/p C3-7 PSF. Patient is hemodynamically stable     -    Multimodal Pain control  -    DVT ppx mechanical  -    Resumed home meds  -    Check AM labs  -    Monitor IGOR output  -    Weight bearing status: WBAT  -    PT/OT  -    Dispo pending  -    C Collar when OOB  -    Appreciate endo recs    Reid Calhoun PGY-2    For any questions please contact the on call orthopedic surgery team, please do not reach out via teams.  [facility] [place]ager: [phone]  [facility] pager: [phone]  [facility] pager: [phone]

## 2024-11-23 NOTE — PROGRESS NOTE ADULT - SUBJECTIVE AND OBJECTIVE BOX
[facility] Division of Hospital Medicine  Radha Lange MD EdM  Available on MS Teams    SUBJECTIVE / OVERNIGHT EVENTS:  No events overnight. Having severe neck pain, improved with IV hydromorphone push, less with PCA or oxycodone. Has more movement/strength in hands. Had a BM right before the OR, none since. Urinating well without Allison.    MEDICATIONS  (STANDING):  acetaminophen     Tablet .. 975 milliGRAM(s) Oral every 8 hours  dextrose 5%. 1000 milliLiter(s) (100 mL/Hr) IV Continuous <Continuous>  dextrose 5%. 1000 milliLiter(s) (50 mL/Hr) IV Continuous <Continuous>  dextrose 50% Injectable 25 Gram(s) IV Push once  dextrose 50% Injectable 12.5 Gram(s) IV Push once  dextrose 50% Injectable 25 Gram(s) IV Push once  glucagon  Injectable 1 milliGRAM(s) IntraMuscular once  insulin glargine Injectable (LANTUS) 20 Unit(s) SubCutaneous at bedtime  insulin lispro (ADMELOG) corrective regimen sliding scale   SubCutaneous at bedtime  insulin lispro (ADMELOG) corrective regimen sliding scale   SubCutaneous three times a day before meals  insulin lispro Injectable (ADMELOG) 6 Unit(s) SubCutaneous three times a day before meals  pantoprazole    Tablet 40 milliGRAM(s) Oral before breakfast  polyethylene glycol 3350 17 Gram(s) Oral daily  senna 2 Tablet(s) Oral at bedtime  tiZANidine 2 milliGRAM(s) Oral every 6 hours    MEDICATIONS  (PRN):  bisacodyl 5 milliGRAM(s) Oral every 12 hours PRN Constipation  dextrose Oral Gel 15 Gram(s) Oral once PRN Blood Glucose LESS THAN 70 milliGRAM(s)/deciliter  HYDROmorphone  Injectable 0.5 milliGRAM(s) IV Push every 3 hours PRN Severe Breakthrough  Pain (7 - 10)  magnesium hydroxide Suspension 30 milliLiter(s) Oral every 12 hours PRN Constipation  ondansetron Injectable 4 milliGRAM(s) IV Push every 6 hours PRN Nausea  ondansetron Injectable 4 milliGRAM(s) IV Push every 6 hours PRN Nausea and/or Vomiting  oxyCODONE    IR 10 milliGRAM(s) Oral every 3 hours PRN Moderate Pain (4 - 6)  oxyCODONE    IR 15 milliGRAM(s) Oral every 3 hours PRN Severe Pain (7 - 10)      I&O's Summary    22 Nov 2024 07:01  -  23 Nov 2024 07:00  --------------------------------------------------------  IN: 0 mL / OUT: 2095 mL / NET: -2095 mL    23 Nov 2024 07:01  -  23 Nov 2024 12:59  --------------------------------------------------------  IN: 0 mL / OUT: 25 mL / NET: -25 mL        PHYSICAL EXAM:  Vital Signs Last 24 Hrs  T(C): 36.6 (23 Nov 2024 09:16), Max: 36.7 (22 Nov 2024 17:55)  T(F): 97.9 (23 Nov 2024 09:16), Max: 98 (22 Nov 2024 17:55)  HR: 85 (23 Nov 2024 09:16) (83 - 100)  BP: 154/77 (23 Nov 2024 09:16) (143/98 - 182/88)  BP(mean): --  RR: 18 (23 Nov 2024 09:16) (17 - 18)  SpO2: 100% (23 Nov 2024 09:16) (97% - 100%)    Parameters below as of 23 Nov 2024 09:16  Patient On (Oxygen Delivery Method): room air      CONSTITUTIONAL: no acute distress, conversant  EYES: conjunctiva and sclera clear  ENMT: Moist oral mucosa  RESPIRATORY: Normal respiratory effort; lungs are clear to auscultation bilaterally  CARDIOVASCULAR: Regular rate and rhythm, normal S1 and S2, no murmur; No lower extremity edema  ABDOMEN: no tenderness to palpation, normoactive bowel sounds, no rebound/guarding  PSYCH: Alert; affect appropriate  NEUROLOGY: CN 2-12 are intact and symmetric; moving all extremities   SKIN: No rashes on examined skin, surgical dressing c/d/i, surgical drain w/ serosanguinous drainage    LABS:                        11.0   8.15  )-----------( 165      ( 23 Nov 2024 05:45 )             32.6     11-23    136  |  102  |  22  ----------------------------<  161[H]  4.3   |  22  |  0.82    Ca    8.9      23 Nov 2024 05:45            Urinalysis Basic - ( 23 Nov 2024 05:45 )    Color: x / Appearance: x / SG: x / pH: x  Gluc: 161 mg/dL / Ketone: x  / Bili: x / Urobili: x   Blood: x / Protein: x / Nitrite: x   Leuk Esterase: x / RBC: x / WBC x   Sq Epi: x / Non Sq Epi: x / Bacteria: x      COORDINATION OF CARE:  Consultant Communication: I discussed plan below with ortho PA/resident

## 2024-11-23 NOTE — PROGRESS NOTE ADULT - PROBLEM SELECTOR PLAN 4
A1C 8%  - home regimen: Lantus 18U qhs + metformin  - endocrine following - basal/bolus adjustments per endocrine  - On dc resume basal insulin and metformin and add Ozempic per endocrine recs    - Check lipid panel with next labs  - Start rosuvastatin 20 mg daily if no contraindication Hb 12 --> 11 (baseline 13)  - postoperative changes  - trend CBC.

## 2024-11-23 NOTE — PROGRESS NOTE ADULT - ASSESSMENT
54M, with hx of PNA ([facility], March 2024 - course c/b Afib and CHF - no longer active issues, not on any medications, follows with cardiology Dr. Keo Alamo, recent EF 65% and back in sinus rhythm), T2DM (A1c 8% on insulin), cervical spine stenosis admitted for C3-7 PSF - OR 11/21.

## 2024-11-23 NOTE — PROGRESS NOTE ADULT - PROBLEM SELECTOR PLAN 7
DVT ppx: SCDs  DIET: CC  DISPO: PT - no needs    Time-based billing (NON-critical care).     35 minutes spent on total encounter; more than 50% of the visit was spent counseling and / or coordinating care by the attending physician.  The necessity of the time spent during the encounter on this date of service was due to:     documentation in [place], reviewing chart and coordinating care with patient/resident and interdisciplinary staff (such as , social workers, etc) as well as reviewing vitals, laboratory data, radiology, medication list, consultants' recommendations and prior records. Interventions were performed as documented above.

## 2024-11-23 NOTE — PROGRESS NOTE ADULT - PROBLEM SELECTOR PLAN 2
Resolved  WBC 12.8 --> 8.5 SBP 200s 11/23 afternoon, asymptomatic. Has had elevated BP throughout admission. Acute elevated most likely related to pain. Review of pharmacy data shows that he was previously prescribed losartan 100 mg daily and spironolactone (last prescribed 3 month supply in March), subsequently d/c as outpatient.    - Treat pain  - Avoid short acting blood pressure medications for asymptomatic hypertension  - Resume prior losartan 100 mg PO daily given that BPs have been persistently elevated throughout admission  - Check BMP in AM to monitor Cr and K on ARB

## 2024-11-23 NOTE — PROGRESS NOTE ADULT - PROBLEM SELECTOR PLAN 6
DVT ppx: SCDs  DIET: CC  DISPO: PT - no needs    Time-based billing (NON-critical care).     35 minutes spent on total encounter; more than 50% of the visit was spent counseling and / or coordinating care by the attending physician.  The necessity of the time spent during the encounter on this date of service was due to:     documentation in [place], reviewing chart and coordinating care with patient/resident and interdisciplinary staff (such as , social workers, etc) as well as reviewing vitals, laboratory data, radiology, medication list, consultants' recommendations and prior records. Interventions were performed as documented above. currently in sinus rhythm, was a complication of sepsis/PNA when hospitalized at [facility] in March 2024, no longer on any medications (rate control agents or AC)  - follows with cardiologist Dr. Keo Alamo, recent EKG in SR, TTE with EF 65% no c/f HF  - outpatient cardiology followup.

## 2024-11-24 LAB
ANION GAP SERPL CALC-SCNC: 10 MMOL/L — SIGNIFICANT CHANGE UP (ref 7–14)
BUN SERPL-MCNC: 18 MG/DL — SIGNIFICANT CHANGE UP (ref 7–23)
CALCIUM SERPL-MCNC: 9.4 MG/DL — SIGNIFICANT CHANGE UP (ref 8.4–10.5)
CHLORIDE SERPL-SCNC: 99 MMOL/L — SIGNIFICANT CHANGE UP (ref 98–107)
CHOLEST SERPL-MCNC: 169 MG/DL — SIGNIFICANT CHANGE UP
CO2 SERPL-SCNC: 25 MMOL/L — SIGNIFICANT CHANGE UP (ref 22–31)
CREAT SERPL-MCNC: 0.89 MG/DL — SIGNIFICANT CHANGE UP (ref 0.5–1.3)
EGFR: 101 ML/MIN/1.73M2 — SIGNIFICANT CHANGE UP
GLUCOSE BLDC GLUCOMTR-MCNC: 208 MG/DL — HIGH (ref 70–99)
GLUCOSE BLDC GLUCOMTR-MCNC: 245 MG/DL — HIGH (ref 70–99)
GLUCOSE BLDC GLUCOMTR-MCNC: 263 MG/DL — HIGH (ref 70–99)
GLUCOSE BLDC GLUCOMTR-MCNC: 281 MG/DL — HIGH (ref 70–99)
GLUCOSE SERPL-MCNC: 230 MG/DL — HIGH (ref 70–99)
HCT VFR BLD CALC: 35.2 % — LOW (ref 39–50)
HDLC SERPL-MCNC: 41 MG/DL — SIGNIFICANT CHANGE UP
HGB BLD-MCNC: 11.6 G/DL — LOW (ref 13–17)
LIPID PNL WITH DIRECT LDL SERPL: 101 MG/DL — HIGH
MCHC RBC-ENTMCNC: 28.5 PG — SIGNIFICANT CHANGE UP (ref 27–34)
MCHC RBC-ENTMCNC: 33 G/DL — SIGNIFICANT CHANGE UP (ref 32–36)
MCV RBC AUTO: 86.5 FL — SIGNIFICANT CHANGE UP (ref 80–100)
NON HDL CHOLESTEROL: 128 MG/DL — SIGNIFICANT CHANGE UP
NRBC # BLD: 0 /100 WBCS — SIGNIFICANT CHANGE UP (ref 0–0)
NRBC # FLD: 0 K/UL — SIGNIFICANT CHANGE UP (ref 0–0)
PLATELET # BLD AUTO: 173 K/UL — SIGNIFICANT CHANGE UP (ref 150–400)
POTASSIUM SERPL-MCNC: 4.8 MMOL/L — SIGNIFICANT CHANGE UP (ref 3.5–5.3)
POTASSIUM SERPL-SCNC: 4.8 MMOL/L — SIGNIFICANT CHANGE UP (ref 3.5–5.3)
RBC # BLD: 4.07 M/UL — LOW (ref 4.2–5.8)
RBC # FLD: 13.1 % — SIGNIFICANT CHANGE UP (ref 10.3–14.5)
SODIUM SERPL-SCNC: 134 MMOL/L — LOW (ref 135–145)
TRIGL SERPL-MCNC: 134 MG/DL — SIGNIFICANT CHANGE UP
WBC # BLD: 7.31 K/UL — SIGNIFICANT CHANGE UP (ref 3.8–10.5)
WBC # FLD AUTO: 7.31 K/UL — SIGNIFICANT CHANGE UP (ref 3.8–10.5)

## 2024-11-24 PROCEDURE — 99232 SBSQ HOSP IP/OBS MODERATE 35: CPT

## 2024-11-24 RX ORDER — INSULIN GLARGINE 100 [IU]/ML
24 INJECTION, SOLUTION SUBCUTANEOUS AT BEDTIME
Refills: 0 | Status: DISCONTINUED | OUTPATIENT
Start: 2024-11-24 | End: 2024-11-25

## 2024-11-24 RX ADMIN — HYDROMORPHONE HYDROCHLORIDE 0.5 MILLIGRAM(S): 2 TABLET ORAL at 08:39

## 2024-11-24 RX ADMIN — OXYCODONE HYDROCHLORIDE 15 MILLIGRAM(S): 30 TABLET ORAL at 18:05

## 2024-11-24 RX ADMIN — ACETAMINOPHEN 500MG 975 MILLIGRAM(S): 500 TABLET, COATED ORAL at 21:42

## 2024-11-24 RX ADMIN — Medication 4: at 16:41

## 2024-11-24 RX ADMIN — HYDROMORPHONE HYDROCHLORIDE 0.5 MILLIGRAM(S): 2 TABLET ORAL at 02:39

## 2024-11-24 RX ADMIN — OXYCODONE HYDROCHLORIDE 15 MILLIGRAM(S): 30 TABLET ORAL at 05:19

## 2024-11-24 RX ADMIN — PANTOPRAZOLE SODIUM 40 MILLIGRAM(S): 40 TABLET, DELAYED RELEASE ORAL at 05:20

## 2024-11-24 RX ADMIN — ACETAMINOPHEN 500MG 975 MILLIGRAM(S): 500 TABLET, COATED ORAL at 06:20

## 2024-11-24 RX ADMIN — OXYCODONE HYDROCHLORIDE 15 MILLIGRAM(S): 30 TABLET ORAL at 13:53

## 2024-11-24 RX ADMIN — OXYCODONE HYDROCHLORIDE 15 MILLIGRAM(S): 30 TABLET ORAL at 06:19

## 2024-11-24 RX ADMIN — HYDROMORPHONE HYDROCHLORIDE 0.5 MILLIGRAM(S): 2 TABLET ORAL at 09:05

## 2024-11-24 RX ADMIN — OXYCODONE HYDROCHLORIDE 15 MILLIGRAM(S): 30 TABLET ORAL at 14:50

## 2024-11-24 RX ADMIN — ACETAMINOPHEN 500MG 975 MILLIGRAM(S): 500 TABLET, COATED ORAL at 13:53

## 2024-11-24 RX ADMIN — OXYCODONE HYDROCHLORIDE 15 MILLIGRAM(S): 30 TABLET ORAL at 11:00

## 2024-11-24 RX ADMIN — HYDROMORPHONE HYDROCHLORIDE 0.5 MILLIGRAM(S): 2 TABLET ORAL at 19:30

## 2024-11-24 RX ADMIN — HYDROMORPHONE HYDROCHLORIDE 0.5 MILLIGRAM(S): 2 TABLET ORAL at 03:39

## 2024-11-24 RX ADMIN — TIZANIDINE 2 MILLIGRAM(S): 4 TABLET ORAL at 22:45

## 2024-11-24 RX ADMIN — Medication 8 UNIT(S): at 16:41

## 2024-11-24 RX ADMIN — HYDROMORPHONE HYDROCHLORIDE 0.5 MILLIGRAM(S): 2 TABLET ORAL at 22:19

## 2024-11-24 RX ADMIN — ACETAMINOPHEN 500MG 975 MILLIGRAM(S): 500 TABLET, COATED ORAL at 14:53

## 2024-11-24 RX ADMIN — OXYCODONE HYDROCHLORIDE 15 MILLIGRAM(S): 30 TABLET ORAL at 17:03

## 2024-11-24 RX ADMIN — OXYCODONE HYDROCHLORIDE 15 MILLIGRAM(S): 30 TABLET ORAL at 21:42

## 2024-11-24 RX ADMIN — TIZANIDINE 2 MILLIGRAM(S): 4 TABLET ORAL at 05:20

## 2024-11-24 RX ADMIN — TIZANIDINE 2 MILLIGRAM(S): 4 TABLET ORAL at 17:04

## 2024-11-24 RX ADMIN — Medication 2: at 22:45

## 2024-11-24 RX ADMIN — HYDROMORPHONE HYDROCHLORIDE 0.5 MILLIGRAM(S): 2 TABLET ORAL at 15:39

## 2024-11-24 RX ADMIN — Medication 6 UNIT(S): at 11:31

## 2024-11-24 RX ADMIN — Medication 4: at 07:31

## 2024-11-24 RX ADMIN — ACETAMINOPHEN 500MG 975 MILLIGRAM(S): 500 TABLET, COATED ORAL at 05:20

## 2024-11-24 RX ADMIN — HYDROMORPHONE HYDROCHLORIDE 0.5 MILLIGRAM(S): 2 TABLET ORAL at 12:00

## 2024-11-24 RX ADMIN — Medication 6 UNIT(S): at 07:30

## 2024-11-24 RX ADMIN — LOSARTAN POTASSIUM 100 MILLIGRAM(S): 100 TABLET, FILM COATED ORAL at 08:42

## 2024-11-24 RX ADMIN — HYDROMORPHONE HYDROCHLORIDE 0.5 MILLIGRAM(S): 2 TABLET ORAL at 18:57

## 2024-11-24 RX ADMIN — HYDROMORPHONE HYDROCHLORIDE 0.5 MILLIGRAM(S): 2 TABLET ORAL at 16:00

## 2024-11-24 RX ADMIN — HYDROMORPHONE HYDROCHLORIDE 0.5 MILLIGRAM(S): 2 TABLET ORAL at 11:41

## 2024-11-24 RX ADMIN — Medication 6: at 11:33

## 2024-11-24 RX ADMIN — OXYCODONE HYDROCHLORIDE 15 MILLIGRAM(S): 30 TABLET ORAL at 20:52

## 2024-11-24 RX ADMIN — HYDROMORPHONE HYDROCHLORIDE 0.5 MILLIGRAM(S): 2 TABLET ORAL at 23:19

## 2024-11-24 RX ADMIN — ACETAMINOPHEN 500MG 975 MILLIGRAM(S): 500 TABLET, COATED ORAL at 22:19

## 2024-11-24 RX ADMIN — TIZANIDINE 2 MILLIGRAM(S): 4 TABLET ORAL at 11:30

## 2024-11-24 RX ADMIN — OXYCODONE HYDROCHLORIDE 15 MILLIGRAM(S): 30 TABLET ORAL at 10:00

## 2024-11-24 RX ADMIN — INSULIN GLARGINE 24 UNIT(S): 100 INJECTION, SOLUTION SUBCUTANEOUS at 22:46

## 2024-11-24 NOTE — PROGRESS NOTE ADULT - PROBLEM SELECTOR PLAN 5
A1C 8%  - home regimen: Lantus 18U qhs + metformin  - endocrine following - basal/bolus adjustments per endocrine  - On dc resume basal insulin and metformin and add Ozempic per endocrine recs    - Check lipid panel with next labs  - Start rosuvastatin 20 mg daily if no contraindication A1C 8%  - home regimen: Lantus 18U qhs + metformin  - endocrine following - basal/bolus adjustments per endocrine  - On dc resume basal insulin and metformin and add Ozempic per endocrine recs    - Check lipid panel with next labs  - Start rosuvastatin 20 mg daily if no contraindication and patient agrees
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
## 2024-11-24 NOTE — PROGRESS NOTE ADULT - PROBLEM SELECTOR PLAN 1
s/p C3-7 PSF - OR 11/21  - post op care and pain control per ortho  - pain service consulted for pain management  - encouraged incentive spirometer use  - PT evaluation - no skilled PT needs  - DVT ppx: SCDs
s/p C3-7 PSF - OR 11/21  - post op care and pain control per ortho  - pain service consulted for pain management  - encouraged incentive spirometer use  - PT evaluation - no skilled PT needs  - DVT ppx: SCDs

## 2024-11-24 NOTE — CHART NOTE - NSCHARTNOTEFT_GEN_A_CORE
Endocrine follow up type 2 DM  chart reviewed  see last progress note for full plan of care    CAPILLARY BLOOD GLUCOSE      POCT Blood Glucose.: 263 mg/dL (24 Nov 2024 11:17)  POCT Blood Glucose.: 245 mg/dL (24 Nov 2024 07:20)  POCT Blood Glucose.: 229 mg/dL (23 Nov 2024 21:01)  POCT Blood Glucose.: 191 mg/dL (23 Nov 2024 16:21)    MEDICATIONS  (STANDING):  acetaminophen     Tablet .. 975 milliGRAM(s) Oral every 8 hours  dextrose 5%. 1000 milliLiter(s) (100 mL/Hr) IV Continuous <Continuous>  dextrose 5%. 1000 milliLiter(s) (50 mL/Hr) IV Continuous <Continuous>  dextrose 50% Injectable 25 Gram(s) IV Push once  dextrose 50% Injectable 12.5 Gram(s) IV Push once  dextrose 50% Injectable 25 Gram(s) IV Push once  glucagon  Injectable 1 milliGRAM(s) IntraMuscular once  insulin glargine Injectable (LANTUS) 24 Unit(s) SubCutaneous at bedtime  insulin lispro (ADMELOG) corrective regimen sliding scale   SubCutaneous three times a day before meals  insulin lispro (ADMELOG) corrective regimen sliding scale   SubCutaneous at bedtime  insulin lispro Injectable (ADMELOG) 8 Unit(s) SubCutaneous three times a day before meals  losartan 100 milliGRAM(s) Oral daily  pantoprazole    Tablet 40 milliGRAM(s) Oral before breakfast  polyethylene glycol 3350 17 Gram(s) Oral daily  senna 2 Tablet(s) Oral at bedtime  tiZANidine 2 milliGRAM(s) Oral every 6 hours    55 y/o M, PMH T2DM, AFib, CAD, JOSLYN, cervical spondylosis w/myelopathy, 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 Endocrinology consulted for hyperglycemia pre-operatively.       #Poorly controlled T2DM with hyperglycemia  - HbA1c: 8.0  - Home Regimen: Lantus 20 units (pt reports he increased it himself to 20 units) and Metformin 1000mg PO BID  - Endocrinologist: Dr. Lion and NP Radha  PLAN  - Goal -180 mg/gl: FS above goal last night and today.   - Increase Lantus 24 units at bedtime. DO NOT HOLD IF NPO.  - Increase Admelog to 8 units TID pre-meal. HOLD IF NPO.  - Continue moderate Admelog correctional scale TID AC  - Continue separate moderate Admelog correctional scale at HS  - Fingerstick BG before meals and bedtime  - Goal -180  - Carbohydrate consistent diet  - RD consult    STEWART Gan-BC  Nurse Practitioner  Division of Endocrinology & Diabetes  pager [phone]

## 2024-11-24 NOTE — PROGRESS NOTE ADULT - PROBLEM SELECTOR PLAN 2
SBP 200s 11/23 afternoon, asymptomatic. Has had elevated BP throughout admission. Acute elevated most likely related to pain. Review of pharmacy data shows that he was previously prescribed losartan 100 mg daily and spironolactone (last prescribed 3 month supply in March), subsequently d/c as outpatient.    - Treat pain  - Avoid short acting blood pressure medications for asymptomatic hypertension  - Resume prior losartan 100 mg PO daily given that BPs have been persistently elevated throughout admission  - Check BMP in AM to monitor Cr and K on ARB SBP 200s 11/23 afternoon, asymptomatic. Has had elevated BP throughout admission. Acute elevated most likely related to pain. Started losartan 100 mg daily on 11/23 - felt nauseated after 1st dose and declined subsequent dose.    Discussed at length on 11/24- he feels that blood pressure was not elevated prior to surgery (SBP 130s) and that elevated BP now is due to pain and so would prefer to focus on pain. Not interested in taking any blood pressure medications at this time. Offered trial of a different agent for BP, but he declined.    - Treat pain  - Avoid short acting blood pressure medications for asymptomatic hypertension  - Losartan ordered but patient declining - okay to discontinue and monitor blood pressures. No urgency to treating asymptomatic HTN even if severe. Will continue to readdress with the patient  - Check BMP in AM to monitor Cr and K on ARB

## 2024-11-24 NOTE — PROGRESS NOTE ADULT - PROBLEM SELECTOR PLAN 6
currently in sinus rhythm, was a complication of sepsis/PNA when hospitalized at [facility] in March 2024, no longer on any medications (rate control agents or AC)  - follows with cardiologist Dr. Keo Alamo, recent EKG in SR, TTE with EF 65% no c/f HF  - outpatient cardiology followup.

## 2024-11-24 NOTE — PROGRESS NOTE ADULT - PROBLEM SELECTOR PLAN 7
DVT ppx: SCDs  DIET: CC  DISPO: PT - no needs    Time-based billing (NON-critical care).     35 minutes spent on total encounter; more than 50% of the visit was spent counseling and / or coordinating care by the attending physician.  The necessity of the time spent during the encounter on this date of service was due to:     documentation in [place], reviewing chart and coordinating care with patient/resident and interdisciplinary staff (such as , social workers, etc) as well as reviewing vitals, laboratory data, radiology, medication list, consultants' recommendations and prior records. Interventions were performed as documented above. DVT ppx: SCDs  DIET: CC  DISPO: PT - no needs    Time-based billing (NON-critical care).   35 minutes spent on total encounter; more than 50% of the visit was spent counseling and / or coordinating care by the attending physician.  The necessity of the time spent during the encounter on this date of service was due to:     documentation in [place], reviewing chart and coordinating care with patient/resident and interdisciplinary staff (such as , social workers, etc) as well as reviewing vitals, laboratory data, radiology, medication list, consultants' recommendations and prior records. Interventions were performed as documented above.

## 2024-11-24 NOTE — PROGRESS NOTE ADULT - ASSESSMENT
54M, with hx of PNA ([facility], March 2024 - course c/b Afib and CHF - no longer active issues, not on any medications, follows with cardiology Dr. Keo Alamo, recent EF 65% and back in sinus rhythm), T2DM (A1c 8% on insulin), cervical spine stenosis admitted for C3-7 PSF - OR 11/21.

## 2024-11-24 NOTE — PROGRESS NOTE ADULT - SUBJECTIVE AND OBJECTIVE BOX
[facility] Division of Hospital Medicine  Radha Lange MD EdM  Available on MS Teams    SUBJECTIVE / OVERNIGHT EVENTS:  No events overnight  Took losartan yesterday, felt nauseated afterwards. Declined losartan this morning because of this.   We discussed his blood pressure - he feels that blood pressure was not elevated prior to surgery (SBP 130s) and that elevated BP now is due to pain and so would prefer to focus on pain. Not interested in taking any blood pressure medications at this time. Offered trial of a different agent for BP, but he declined.    MEDICATIONS  (STANDING):  acetaminophen     Tablet .. 975 milliGRAM(s) Oral every 8 hours  dextrose 5%. 1000 milliLiter(s) (100 mL/Hr) IV Continuous <Continuous>  dextrose 5%. 1000 milliLiter(s) (50 mL/Hr) IV Continuous <Continuous>  dextrose 50% Injectable 25 Gram(s) IV Push once  dextrose 50% Injectable 12.5 Gram(s) IV Push once  dextrose 50% Injectable 25 Gram(s) IV Push once  glucagon  Injectable 1 milliGRAM(s) IntraMuscular once  insulin glargine Injectable (LANTUS) 20 Unit(s) SubCutaneous at bedtime  insulin lispro (ADMELOG) corrective regimen sliding scale   SubCutaneous three times a day before meals  insulin lispro (ADMELOG) corrective regimen sliding scale   SubCutaneous at bedtime  insulin lispro Injectable (ADMELOG) 6 Unit(s) SubCutaneous three times a day before meals  losartan 100 milliGRAM(s) Oral daily  pantoprazole    Tablet 40 milliGRAM(s) Oral before breakfast  polyethylene glycol 3350 17 Gram(s) Oral daily  senna 2 Tablet(s) Oral at bedtime  tiZANidine 2 milliGRAM(s) Oral every 6 hours    MEDICATIONS  (PRN):  bisacodyl 5 milliGRAM(s) Oral every 12 hours PRN Constipation  dextrose Oral Gel 15 Gram(s) Oral once PRN Blood Glucose LESS THAN 70 milliGRAM(s)/deciliter  HYDROmorphone  Injectable 0.5 milliGRAM(s) IV Push every 3 hours PRN Severe Breakthrough  Pain (7 - 10)  magnesium hydroxide Suspension 30 milliLiter(s) Oral every 12 hours PRN Constipation  ondansetron Injectable 4 milliGRAM(s) IV Push every 6 hours PRN Nausea  ondansetron Injectable 4 milliGRAM(s) IV Push every 6 hours PRN Nausea and/or Vomiting  oxyCODONE    IR 10 milliGRAM(s) Oral every 3 hours PRN Moderate Pain (4 - 6)  oxyCODONE    IR 15 milliGRAM(s) Oral every 3 hours PRN Severe Pain (7 - 10)      I&O's Summary    23 Nov 2024 07:01  -  24 Nov 2024 07:00  --------------------------------------------------------  IN: 0 mL / OUT: 120 mL / NET: -120 mL    24 Nov 2024 07:01  -  24 Nov 2024 12:18  --------------------------------------------------------  IN: 0 mL / OUT: 425 mL / NET: -425 mL        PHYSICAL EXAM:  Vital Signs Last 24 Hrs  T(C): 37 (24 Nov 2024 09:29), Max: 37 (24 Nov 2024 09:29)  T(F): 98.6 (24 Nov 2024 09:29), Max: 98.6 (24 Nov 2024 09:29)  HR: 97 (24 Nov 2024 11:38) (87 - 105)  BP: 180/95 (24 Nov 2024 11:38) (143/82 - 202/100)  BP(mean): --  RR: 18 (24 Nov 2024 11:38) (17 - 20)  SpO2: 100% (24 Nov 2024 11:38) (97% - 100%)    Parameters below as of 24 Nov 2024 11:38  Patient On (Oxygen Delivery Method): room air      CONSTITUTIONAL: no acute distress, conversant  EYES: conjunctiva and sclera clear  ENMT: Moist oral mucosa  RESPIRATORY: Normal respiratory effort; lungs are clear to auscultation bilaterally  CARDIOVASCULAR: Regular rate and rhythm, normal S1 and S2, no murmur; No lower extremity edema  ABDOMEN: no tenderness to palpation, normoactive bowel sounds, no rebound/guarding  PSYCH: Alert; affect appropriate  NEUROLOGY: CN 2-12 are intact and symmetric; moving all extremities   SKIN: No rashes on examined skin, surgical dressing c/d/i, surgical drain w/ serosanguinous drainage    LABS:                        11.6   7.31  )-----------( 173      ( 24 Nov 2024 05:06 )             35.2     11-24    134[L]  |  99  |  18  ----------------------------<  230[H]  4.8   |  25  |  0.89    Ca    9.4      24 Nov 2024 05:06      COORDINATION OF CARE:  Consultant Communication: I discussed plan below with ortho PA/resident

## 2024-11-25 ENCOUNTER — TRANSCRIPTION ENCOUNTER (OUTPATIENT)
Age: 54
End: 2024-11-25

## 2024-11-25 LAB
GLUCOSE BLDC GLUCOMTR-MCNC: 191 MG/DL — HIGH (ref 70–99)
GLUCOSE BLDC GLUCOMTR-MCNC: 222 MG/DL — HIGH (ref 70–99)
GLUCOSE BLDC GLUCOMTR-MCNC: 230 MG/DL — HIGH (ref 70–99)
GLUCOSE BLDC GLUCOMTR-MCNC: 290 MG/DL — HIGH (ref 70–99)

## 2024-11-25 PROCEDURE — 99232 SBSQ HOSP IP/OBS MODERATE 35: CPT

## 2024-11-25 RX ORDER — NALOXONE HCL 0.4 MG/ML
1 AMPUL (ML) INJECTION
Qty: 1 | Refills: 0
Start: 2024-11-25 | End: 2024-11-25

## 2024-11-25 RX ORDER — ORAL SEMAGLUTIDE 7 MG/1
0.25 TABLET ORAL
Qty: 2 | Refills: 0
Start: 2024-11-25 | End: 2024-12-22

## 2024-11-25 RX ORDER — POLYETHYLENE GLYCOL 3350 17 G/17G
17 POWDER, FOR SOLUTION ORAL
Qty: 238 | Refills: 0
Start: 2024-11-25 | End: 2024-12-08

## 2024-11-25 RX ORDER — PANTOPRAZOLE SODIUM 40 MG/1
1 TABLET, DELAYED RELEASE ORAL
Qty: 30 | Refills: 0
Start: 2024-11-25 | End: 2024-12-24

## 2024-11-25 RX ORDER — OXYCODONE HYDROCHLORIDE 30 MG/1
1 TABLET ORAL
Qty: 42 | Refills: 0
Start: 2024-11-25 | End: 2024-12-01

## 2024-11-25 RX ORDER — INSULIN GLARGINE 100 [IU]/ML
28 INJECTION, SOLUTION SUBCUTANEOUS AT BEDTIME
Refills: 0 | Status: DISCONTINUED | OUTPATIENT
Start: 2024-11-25 | End: 2024-11-26

## 2024-11-25 RX ORDER — SENNOSIDES 8.6 MG
2 TABLET ORAL
Qty: 28 | Refills: 0
Start: 2024-11-25 | End: 2024-12-08

## 2024-11-25 RX ORDER — INSULIN GLARGINE 100 [IU]/ML
28 INJECTION, SOLUTION SUBCUTANEOUS
Qty: 1 | Refills: 0
Start: 2024-11-25 | End: 2024-12-24

## 2024-11-25 RX ORDER — TIZANIDINE 4 MG/1
1 TABLET ORAL
Qty: 21 | Refills: 0
Start: 2024-11-25 | End: 2024-12-01

## 2024-11-25 RX ORDER — INSULIN GLARGINE 100 [IU]/ML
24 INJECTION, SOLUTION SUBCUTANEOUS
Qty: 0 | Refills: 0 | DISCHARGE
Start: 2024-11-25

## 2024-11-25 RX ORDER — INSULIN GLARGINE,HUM.REC.ANLOG 100/ML
18 VIAL (ML) SUBCUTANEOUS
Refills: 0 | DISCHARGE

## 2024-11-25 RX ORDER — ACETAMINOPHEN 500MG 500 MG/1
3 TABLET, COATED ORAL
Qty: 0 | Refills: 0 | DISCHARGE
Start: 2024-11-25

## 2024-11-25 RX ADMIN — OXYCODONE HYDROCHLORIDE 15 MILLIGRAM(S): 30 TABLET ORAL at 17:15

## 2024-11-25 RX ADMIN — Medication 8 UNIT(S): at 11:30

## 2024-11-25 RX ADMIN — ACETAMINOPHEN 500MG 975 MILLIGRAM(S): 500 TABLET, COATED ORAL at 14:00

## 2024-11-25 RX ADMIN — Medication 8 UNIT(S): at 17:13

## 2024-11-25 RX ADMIN — Medication 8 UNIT(S): at 07:42

## 2024-11-25 RX ADMIN — LOSARTAN POTASSIUM 100 MILLIGRAM(S): 100 TABLET, FILM COATED ORAL at 05:19

## 2024-11-25 RX ADMIN — INSULIN GLARGINE 28 UNIT(S): 100 INJECTION, SOLUTION SUBCUTANEOUS at 22:20

## 2024-11-25 RX ADMIN — OXYCODONE HYDROCHLORIDE 15 MILLIGRAM(S): 30 TABLET ORAL at 07:56

## 2024-11-25 RX ADMIN — ACETAMINOPHEN 500MG 975 MILLIGRAM(S): 500 TABLET, COATED ORAL at 13:04

## 2024-11-25 RX ADMIN — ACETAMINOPHEN 500MG 975 MILLIGRAM(S): 500 TABLET, COATED ORAL at 22:09

## 2024-11-25 RX ADMIN — HYDROMORPHONE HYDROCHLORIDE 0.5 MILLIGRAM(S): 2 TABLET ORAL at 10:21

## 2024-11-25 RX ADMIN — TIZANIDINE 2 MILLIGRAM(S): 4 TABLET ORAL at 05:20

## 2024-11-25 RX ADMIN — OXYCODONE HYDROCHLORIDE 15 MILLIGRAM(S): 30 TABLET ORAL at 08:55

## 2024-11-25 RX ADMIN — OXYCODONE HYDROCHLORIDE 15 MILLIGRAM(S): 30 TABLET ORAL at 21:09

## 2024-11-25 RX ADMIN — Medication 4: at 17:12

## 2024-11-25 RX ADMIN — ACETAMINOPHEN 500MG 975 MILLIGRAM(S): 500 TABLET, COATED ORAL at 05:20

## 2024-11-25 RX ADMIN — ACETAMINOPHEN 500MG 975 MILLIGRAM(S): 500 TABLET, COATED ORAL at 06:20

## 2024-11-25 RX ADMIN — OXYCODONE HYDROCHLORIDE 15 MILLIGRAM(S): 30 TABLET ORAL at 20:18

## 2024-11-25 RX ADMIN — OXYCODONE HYDROCHLORIDE 15 MILLIGRAM(S): 30 TABLET ORAL at 01:05

## 2024-11-25 RX ADMIN — HYDROMORPHONE HYDROCHLORIDE 0.5 MILLIGRAM(S): 2 TABLET ORAL at 01:30

## 2024-11-25 RX ADMIN — HYDROMORPHONE HYDROCHLORIDE 0.5 MILLIGRAM(S): 2 TABLET ORAL at 15:10

## 2024-11-25 RX ADMIN — ACETAMINOPHEN 500MG 975 MILLIGRAM(S): 500 TABLET, COATED ORAL at 21:09

## 2024-11-25 RX ADMIN — Medication 2: at 11:30

## 2024-11-25 RX ADMIN — TIZANIDINE 2 MILLIGRAM(S): 4 TABLET ORAL at 13:04

## 2024-11-25 RX ADMIN — OXYCODONE HYDROCHLORIDE 15 MILLIGRAM(S): 30 TABLET ORAL at 04:07

## 2024-11-25 RX ADMIN — HYDROMORPHONE HYDROCHLORIDE 0.5 MILLIGRAM(S): 2 TABLET ORAL at 14:14

## 2024-11-25 RX ADMIN — Medication 6: at 07:35

## 2024-11-25 RX ADMIN — OXYCODONE HYDROCHLORIDE 15 MILLIGRAM(S): 30 TABLET ORAL at 00:05

## 2024-11-25 RX ADMIN — OXYCODONE HYDROCHLORIDE 15 MILLIGRAM(S): 30 TABLET ORAL at 18:15

## 2024-11-25 RX ADMIN — OXYCODONE HYDROCHLORIDE 15 MILLIGRAM(S): 30 TABLET ORAL at 11:31

## 2024-11-25 RX ADMIN — HYDROMORPHONE HYDROCHLORIDE 0.5 MILLIGRAM(S): 2 TABLET ORAL at 07:09

## 2024-11-25 RX ADMIN — HYDROMORPHONE HYDROCHLORIDE 0.5 MILLIGRAM(S): 2 TABLET ORAL at 07:39

## 2024-11-25 RX ADMIN — OXYCODONE HYDROCHLORIDE 15 MILLIGRAM(S): 30 TABLET ORAL at 03:07

## 2024-11-25 RX ADMIN — PANTOPRAZOLE SODIUM 40 MILLIGRAM(S): 40 TABLET, DELAYED RELEASE ORAL at 05:19

## 2024-11-25 RX ADMIN — OXYCODONE HYDROCHLORIDE 15 MILLIGRAM(S): 30 TABLET ORAL at 12:30

## 2024-11-25 RX ADMIN — HYDROMORPHONE HYDROCHLORIDE 0.5 MILLIGRAM(S): 2 TABLET ORAL at 11:20

## 2024-11-25 RX ADMIN — HYDROMORPHONE HYDROCHLORIDE 0.5 MILLIGRAM(S): 2 TABLET ORAL at 02:30

## 2024-11-25 NOTE — DISCHARGE NOTE NURSING/CASE MANAGEMENT/SOCIAL WORK - NSSCTYPOFSERV_GEN_ALL_CORE
A Nurse is anticipated to visit you at home; the above home health agency will contact you to arrange the time of initial VN visit.

## 2024-11-25 NOTE — PROGRESS NOTE ADULT - ASSESSMENT
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 Endocrinology consulted for hyperglycemia pre-operatively.       #Poorly controlled T2DM with hyperglycemia  - HbA1c: 8.0  - Home Regimen: Lantus 20 units (pt reports he increased it himself to 20 units) and Metformin 1000mg PO BID  - Endocrinologist: Dr. Lion and NP Radha  PLAN  - Goal -180 mg/gl: am and prandial FS above goal.   - Increase to Lantus 28 units at bedtime. DO NOT HOLD IF NPO.  - Continue Admelog 8 units TID pre-meal. HOLD IF NPO.  - Continue moderate Admelog correctional scale TID AC  - Continue separate moderate Admelog correctional scale at HS  - Fingerstick BG before meals and bedtime  - Goal -180  - Carbohydrate consistent diet  - RD consult pending    Discharge plan:  - Patient does not have Part D medication coverage. Ozempic which was part of the discharge plan to unaffordable without insurance.   - Discharge medications: Lantus 28 units at bedtime and Metformin 1000mg PO PO BID with meals.   - Patient to call doctor with persistent high or low BG at home.   - Ensure patient has glucometer, test strips and lancets on discharge.  - Follow up with outpatient Endocrinology, scheduled with Dr Lion in 1/13/2025: [facility] at [address] [place] [address] phone number # [phone].  - Recommend routine outpatient ophthalmology, podiatry f/u    #HTN  - Can check urine albumin/creatinine outpatient annually  - Outpatient goal BP <130/80. Management per primary team.    #HLD  - Goal LDL < 70 mg/dl  - Can check lipid profile if not done recently  - Would likely benefit from a statin if no contraindication    STEWART Tran-BC  Nurse Practitioner  Division of Endocrinology  Contact on TEAMS    If out of hospital/unavailable when paged, please note: patient will be cared for by another provider on the endocrine service.  For urgent concerns: call the endocrine answering service for assistance to reach covering provider ([phone]). For non-urgent matters: please email [email or web address] for assistance.

## 2024-11-25 NOTE — DISCHARGE NOTE NURSING/CASE MANAGEMENT/SOCIAL WORK - NSDCPECAREGIVERED_GEN_ALL_CORE
diabetes  laminectomy  discharge brochure for post op spine instructions  caring for my incision  pain management

## 2024-11-25 NOTE — PROGRESS NOTE ADULT - SUBJECTIVE AND OBJECTIVE BOX
Orthopedic Surgery Progress Note     S: Patient seen and examined today. No acute events overnight. Pain partially controlled. Denies f/c, chest pain, shortness of breath, dizziness.    MEDICATIONS  (STANDING):  acetaminophen     Tablet .. 975 milliGRAM(s) Oral every 8 hours  dextrose 5%. 1000 milliLiter(s) (100 mL/Hr) IV Continuous <Continuous>  dextrose 5%. 1000 milliLiter(s) (50 mL/Hr) IV Continuous <Continuous>  dextrose 50% Injectable 25 Gram(s) IV Push once  dextrose 50% Injectable 12.5 Gram(s) IV Push once  dextrose 50% Injectable 25 Gram(s) IV Push once  glucagon  Injectable 1 milliGRAM(s) IntraMuscular once  insulin glargine Injectable (LANTUS) 24 Unit(s) SubCutaneous at bedtime  insulin lispro (ADMELOG) corrective regimen sliding scale   SubCutaneous three times a day before meals  insulin lispro (ADMELOG) corrective regimen sliding scale   SubCutaneous at bedtime  insulin lispro Injectable (ADMELOG) 8 Unit(s) SubCutaneous three times a day before meals  losartan 100 milliGRAM(s) Oral daily  pantoprazole    Tablet 40 milliGRAM(s) Oral before breakfast  polyethylene glycol 3350 17 Gram(s) Oral daily  senna 2 Tablet(s) Oral at bedtime  tiZANidine 2 milliGRAM(s) Oral every 6 hours    MEDICATIONS  (PRN):  bisacodyl 5 milliGRAM(s) Oral every 12 hours PRN Constipation  dextrose Oral Gel 15 Gram(s) Oral once PRN Blood Glucose LESS THAN 70 milliGRAM(s)/deciliter  HYDROmorphone  Injectable 0.5 milliGRAM(s) IV Push every 3 hours PRN Severe Breakthrough  Pain (7 - 10)  magnesium hydroxide Suspension 30 milliLiter(s) Oral every 12 hours PRN Constipation  ondansetron Injectable 4 milliGRAM(s) IV Push every 6 hours PRN Nausea  ondansetron Injectable 4 milliGRAM(s) IV Push every 6 hours PRN Nausea and/or Vomiting  oxyCODONE    IR 10 milliGRAM(s) Oral every 3 hours PRN Moderate Pain (4 - 6)  oxyCODONE    IR 15 milliGRAM(s) Oral every 3 hours PRN Severe Pain (7 - 10)      Vital Signs Last 24 Hrs  T(C): 36.8 (25 Nov 2024 05:23), Max: 37 (24 Nov 2024 09:29)  T(F): 98.2 (25 Nov 2024 05:23), Max: 98.6 (24 Nov 2024 09:29)  HR: 98 (25 Nov 2024 05:23) (86 - 105)  BP: 140/91 (25 Nov 2024 05:23) (140/91 - 180/95)  BP(mean): --  RR: 18 (25 Nov 2024 05:23) (17 - 18)  SpO2: 99% (25 Nov 2024 05:23) (98% - 100%)    Parameters below as of 25 Nov 2024 05:23  Patient On (Oxygen Delivery Method): room air        11-23-24 @ 07:01  -  11-24-24 @ 07:00  --------------------------------------------------------  IN: 0 mL / OUT: 120 mL / NET: -120 mL    11-24-24 @ 07:01  -  11-25-24 @ 06:58  --------------------------------------------------------  IN: 0 mL / OUT: 730 mL / NET: -730 mL        Physical Exam:  Constitutional: No Acute Distress   Neurological: AOx3, Following Commands  Spine: Incision c/d/i. Drain with SS output  Sensation: [x] intact to light touch  [] decreased:   Motor exam:          Upper extremity                         Delt     Bicep     Tricep    HG                                                 R         5/5        5/5        5/5       5/5                                               L          5/5        5/5        5/5       5/5                                                 BLE: WWP, compartments soft and compressible    LABS:                        11.6   7.31  )-----------( 173      ( 24 Nov 2024 05:06 )             35.2     11-24    134[L]  |  99  |  18  ----------------------------<  230[H]  4.8   |  25  |  0.89    Ca    9.4      24 Nov 2024 05:06

## 2024-11-25 NOTE — DISCHARGE NOTE NURSING/CASE MANAGEMENT/SOCIAL WORK - PATIENT PORTAL LINK FT
You can access the FollowMyHealth Patient Portal offered by [facility] by registering at the following website: [email or web address]. By joining [email or web address]’s FollowMyHealth portal, you will also be able to view your health information using other applications (apps) compatible with our system.

## 2024-11-25 NOTE — PROGRESS NOTE ADULT - ASSESSMENT
Pt is a with 53 y/o Male s/p C3-7 PSF. Patient is hemodynamically stable     -    Multimodal Pain control-f/u with pain service   -    Please document R foot wound recs, thank you  -    DVT ppx mechanical  -    Check AM labs  -    Monitor IGOR output  -    Weight bearing status: WBAT  -    PT/OT  -    Dispo home  -    C Collar when OOB  -    Appreciate endo recs

## 2024-11-25 NOTE — PROGRESS NOTE ADULT - SUBJECTIVE AND OBJECTIVE BOX
Chief Complaint: T2DM    Interval Events: Pt seen and examined at bedside. Pt tolerating carb consistent diet with no nausea, no vomiting.    MEDICATIONS  (STANDING):  acetaminophen     Tablet .. 975 milliGRAM(s) Oral every 8 hours  dextrose 5%. 1000 milliLiter(s) (100 mL/Hr) IV Continuous <Continuous>  dextrose 5%. 1000 milliLiter(s) (50 mL/Hr) IV Continuous <Continuous>  dextrose 50% Injectable 25 Gram(s) IV Push once  dextrose 50% Injectable 12.5 Gram(s) IV Push once  dextrose 50% Injectable 25 Gram(s) IV Push once  glucagon  Injectable 1 milliGRAM(s) IntraMuscular once  insulin glargine Injectable (LANTUS) 28 Unit(s) SubCutaneous at bedtime  insulin lispro (ADMELOG) corrective regimen sliding scale   SubCutaneous three times a day before meals  insulin lispro (ADMELOG) corrective regimen sliding scale   SubCutaneous at bedtime  insulin lispro Injectable (ADMELOG) 8 Unit(s) SubCutaneous three times a day before meals  losartan 100 milliGRAM(s) Oral daily  pantoprazole    Tablet 40 milliGRAM(s) Oral before breakfast  polyethylene glycol 3350 17 Gram(s) Oral daily  senna 2 Tablet(s) Oral at bedtime    MEDICATIONS  (PRN):  bisacodyl 5 milliGRAM(s) Oral every 12 hours PRN Constipation  dextrose Oral Gel 15 Gram(s) Oral once PRN Blood Glucose LESS THAN 70 milliGRAM(s)/deciliter  HYDROmorphone  Injectable 0.5 milliGRAM(s) IV Push every 3 hours PRN Severe Breakthrough  Pain (7 - 10)  magnesium hydroxide Suspension 30 milliLiter(s) Oral every 12 hours PRN Constipation  ondansetron Injectable 4 milliGRAM(s) IV Push every 6 hours PRN Nausea  ondansetron Injectable 4 milliGRAM(s) IV Push every 6 hours PRN Nausea and/or Vomiting  oxyCODONE    IR 10 milliGRAM(s) Oral every 3 hours PRN Moderate Pain (4 - 6)  oxyCODONE    IR 15 milliGRAM(s) Oral every 3 hours PRN Severe Pain (7 - 10)      Allergies    No Known Allergies    Intolerances      Review of Systems:  Constitutional: neck pain  Eyes: No blurry vision  Cardiovascular: No chest pain, palpitations  Respiratory: No SOB, no cough  GI: No nausea, vomiting, abdominal pain  : No dysuria  Endocrine: no polyuria, polydipsia    ALL OTHER SYSTEMS REVIEWED AND NEGATIVE    VITALS: T(C): 36.6 (11-25-24 @ 10:06)  T(F): 97.8 (11-25-24 @ 10:06), Max: 98.2 (11-25-24 @ 02:08)  HR: 89 (11-25-24 @ 10:06) (86 - 102)  BP: 142/- (11-25-24 @ 10:06) (140/91 - 173/80)  RR:  (18 - 18)  SpO2:  (97% - 100%)  Wt(kg): --      Physical Exam:   GENERAL: NAD, well-developed  EYES: No proptosis  HEENT:  Atraumatic, Normocephalic  RESPIRATORY: non labored breathing   GI: Non distended  PSYCH: Alert, normal affect, normal mood    CAPILLARY BLOOD GLUCOSE    POCT Blood Glucose.: 191 mg/dL (25 Nov 2024 11:27)  POCT Blood Glucose.: 290 mg/dL (25 Nov 2024 07:34)  POCT Blood Glucose.: 281 mg/dL (24 Nov 2024 22:37)  POCT Blood Glucose.: 208 mg/dL (24 Nov 2024 16:37)      11-24    134[L]  |  99  |  18  ----------------------------<  230[H]  4.8   |  25  |  0.89    eGFR: 101    Ca    9.4      11-24        A1C with Estimated Average Glucose Result: 8.0 % (11-15-24 @ 07:30)      Thyroid Function Tests:

## 2024-11-25 NOTE — PHARMACOTHERAPY INTERVENTION NOTE - INTERVENTION CATEGORIES
Patient Education Yes - the patient is able to be screened Patient/Caregiver provided printed discharge information.

## 2024-11-25 NOTE — DISCHARGE NOTE NURSING/CASE MANAGEMENT/SOCIAL WORK - NSDCPNINST_GEN_ALL_CORE
You have a post op appointment with Dr. Arvizu on December 11, 2024 @ 12:45pm in the [address] [place] [address] office. If you are unable to keep this appointment, please call the office to reschedule. Call MD if you develop a fever, or if there is redness, swelling, drainage or pain not relieved by pain medication. No heavy lifting, bending, or straining to move your bowels. Take over the counter stool softeners as needed to prevent constipation which may be caused by pain medication. Your A1C= 8.0. Continue to follow a consistent diet and take your medications for diabetes. Follow up with your primary doctor/endocrinologist for better diabetes management. Wound care on foot as instructed. You have a post op appointment with Dr. Arvizu on December 11, 2024 @ 12:45pm in the [address] [place] [address] office. If you are unable to keep this appointment, please call the office to reschedule. Call MD if you develop a fever, or if there is redness, swelling, drainage or pain not relieved by pain medication. No heavy lifting, bending, or straining to move your bowels. Take over the counter stool softeners as needed to prevent constipation which may be caused by pain medication. Your A1C= 8.0. Continue to follow a consistent diet and take your medications for diabetes. Follow up with your primary doctor/endocrinologist for better diabetes management. Wound care on foot as instructed. right planta cleanse with NS, apply liquid barrier film to periwound, allow to dry, apply iodosorb gel to base of wound, abd pad and kerlex.  change 3xs aweek and as needed.  Left anterior BKA, cleanse with NS, apply liquid barrier film, allow to dry, apply silicone foam with border.  Change 3 times a week and as needed.  periwound

## 2024-11-25 NOTE — DISCHARGE NOTE NURSING/CASE MANAGEMENT/SOCIAL WORK - FINANCIAL ASSISTANCE
[facility] provides services at a reduced cost to those who are determined to be eligible through [facility]’s financial assistance program. Information regarding [facility]’s financial assistance program can be found by going to [email or web address] or by calling [phone].

## 2024-11-25 NOTE — PHARMACOTHERAPY INTERVENTION NOTE - COMMENTS
Pt educated on A1c, goal A1c, hypoglycemia symptoms and treatment, healthy plate, blood glucose monitoring, and goal blood glucose. Pt verbalized understanding. Pt demonstrated basal insulin pen use. Pt's hand dexterity is limited, however he was able to attach pen needle, dial insulin pen to 28 units and inject. Discharge diabetes medications were reviewed with the patient (basal insulin (Basaglar) and metformin 1g BID). Pt was provided with [employer] for Basaglar pen box for $35 ID: [id number], GRP: [id number], BIN: [id number], PCN: SSN For   Reviewed with pt that he has follow up appointment with endocrine on January 13th.    Kat Raygoza, PharmD  PGY-1 Pharmacy Resident  Spectra [id number]  Pt educated on A1c, goal A1c, hypoglycemia symptoms and treatment, healthy plate, blood glucose monitoring, and goal blood glucose. Pt verbalized understanding. Pt demonstrated basal insulin pen use. Pt's hand dexterity is limited, however he was able to attach pen needle, dial insulin pen to 28 units and inject practice pen (states some times he bends the needle due to his hands). Discharge diabetes medications were reviewed with the patient (basal insulin (Basaglar) and metformin 1g BID). Pt was provided with [employer] coupon for Basaglar pen box for $35 ID: [id number], GRP: [id number], BIN: [id number], PCN: KANDIN.   Reviewed with pt that he has follow up appointment with endocrine on January 13th and to call if BG is running high or low.    Kat Raygoza, PharmD  PGY-1 Pharmacy Resident  Spectra [id number]  Pt educated on A1c, goal A1c, hypoglycemia symptoms and treatment, healthy plate, blood glucose monitoring, and goal blood glucose. Pt verbalized understanding. Pt demonstrated basal insulin pen use. Pt's hand dexterity is limited, however he was able to attach pen needle, dial insulin pen to 28 units and inject practice pen (states some times he bends the needle due to his hands). Discharge diabetes medications were reviewed with the patient (basal insulin (Basaglar) and metformin 1g BID). Pt was provided with [employer] coupon for Basaglar pen box for $35 ID: [id number], GRP: [id number], BIN: [id number], PCN: SSN. He does not have any Dexcom sensors at home (was sample from the office) - will ask office to see if they can send RX to DME since Medicare requires outpatient notes. Patient has glucometer at home but states it is challenging with his hands (he agrees to check once a day).    Reviewed with pt that he has follow up appointment with endocrine on January 13th and to call if BG is running high or low.    Kat Raygoza, PharmD  PGY-1 Pharmacy Resident   Spectra [id number]  Pt educated on A1c, goal A1c, hypoglycemia symptoms and treatment, healthy plate, blood glucose monitoring, and goal blood glucose. Pt verbalized understanding. Pt demonstrated basal insulin pen use. Pt's hand dexterity is limited, however he was able to attach pen needle, dial insulin pen to 28 units and inject practice pen (states some times he bends the needle due to his hands). Discharge diabetes medications were reviewed with the patient (basal insulin (Basaglar) and metformin 1g BID). Pt was provided with [employer] coupon for Basaglar pen box for $35 ID: [id number], GRP: [id number], BIN: [id number], PCN: SSN. He does not have any Dexcom sensors at home (was sample from the office) - will ask office to see if they can send RX to DME since Medicare requires outpatient notes. Patient has glucometer at home but states it is challenging with his hands (he agrees to check once a day).    Reviewed with pt that he has follow up appointment with endocrine on January 13th and to call if BG is running high or low.    Kat Raygoza, PharmD  PGY-1 Pharmacy Resident   Spectra [id number]     11/26/24: Reviewed final diabetes discharge plan. Counseled patient on how to use Dexcom G7 (REF: [id number], LOT: [id number], exp 8/31/2025) including where to place (right arm, away from tattoo), how often to change sensor/site (every 10 days), alarm features, when to remove (MRI/CT scan, etc.), if it falls off to replace with new sensor, and how to pair with his phone jhonatan. Asked his outpatient endocrine office to send Dexcom paperwork to DME in network with Medicare Part B. Patient able to verbalize final basal insulin and metformin plan and understands when to call his endocrinologist.

## 2024-11-26 VITALS
SYSTOLIC BLOOD PRESSURE: 146 MMHG | OXYGEN SATURATION: 100 % | TEMPERATURE: 98 F | RESPIRATION RATE: 18 BRPM | DIASTOLIC BLOOD PRESSURE: 74 MMHG | HEART RATE: 93 BPM

## 2024-11-26 LAB
GLUCOSE BLDC GLUCOMTR-MCNC: 231 MG/DL — HIGH (ref 70–99)
GLUCOSE BLDC GLUCOMTR-MCNC: 248 MG/DL — HIGH (ref 70–99)
GLUCOSE BLDC GLUCOMTR-MCNC: 265 MG/DL — HIGH (ref 70–99)

## 2024-11-26 PROCEDURE — 99232 SBSQ HOSP IP/OBS MODERATE 35: CPT

## 2024-11-26 RX ORDER — INSULIN GLARGINE 100 [IU]/ML
30 INJECTION, SOLUTION SUBCUTANEOUS AT BEDTIME
Refills: 0 | Status: DISCONTINUED | OUTPATIENT
Start: 2024-11-26 | End: 2024-11-26

## 2024-11-26 RX ORDER — POVIDONE, POLYVINYL ALCOHOL 20; 27 G/1000ML; G/1000ML
1 SOLUTION OPHTHALMIC THREE TIMES A DAY
Refills: 0 | Status: DISCONTINUED | OUTPATIENT
Start: 2024-11-26 | End: 2024-11-26

## 2024-11-26 RX ORDER — TIZANIDINE 4 MG/1
1 TABLET ORAL
Qty: 21 | Refills: 0
Start: 2024-11-26 | End: 2024-12-02

## 2024-11-26 RX ADMIN — POVIDONE, POLYVINYL ALCOHOL 1 DROP(S): 20; 27 SOLUTION OPHTHALMIC at 09:57

## 2024-11-26 RX ADMIN — OXYCODONE HYDROCHLORIDE 15 MILLIGRAM(S): 30 TABLET ORAL at 10:49

## 2024-11-26 RX ADMIN — Medication 6: at 11:48

## 2024-11-26 RX ADMIN — OXYCODONE HYDROCHLORIDE 15 MILLIGRAM(S): 30 TABLET ORAL at 02:22

## 2024-11-26 RX ADMIN — Medication 10 UNIT(S): at 16:38

## 2024-11-26 RX ADMIN — OXYCODONE HYDROCHLORIDE 15 MILLIGRAM(S): 30 TABLET ORAL at 05:56

## 2024-11-26 RX ADMIN — ACETAMINOPHEN 500MG 975 MILLIGRAM(S): 500 TABLET, COATED ORAL at 14:47

## 2024-11-26 RX ADMIN — PANTOPRAZOLE SODIUM 40 MILLIGRAM(S): 40 TABLET, DELAYED RELEASE ORAL at 05:56

## 2024-11-26 RX ADMIN — ACETAMINOPHEN 500MG 975 MILLIGRAM(S): 500 TABLET, COATED ORAL at 05:56

## 2024-11-26 RX ADMIN — OXYCODONE HYDROCHLORIDE 15 MILLIGRAM(S): 30 TABLET ORAL at 13:47

## 2024-11-26 RX ADMIN — OXYCODONE HYDROCHLORIDE 15 MILLIGRAM(S): 30 TABLET ORAL at 16:52

## 2024-11-26 RX ADMIN — OXYCODONE HYDROCHLORIDE 15 MILLIGRAM(S): 30 TABLET ORAL at 04:26

## 2024-11-26 RX ADMIN — OXYCODONE HYDROCHLORIDE 15 MILLIGRAM(S): 30 TABLET ORAL at 07:48

## 2024-11-26 RX ADMIN — Medication 10 UNIT(S): at 11:48

## 2024-11-26 RX ADMIN — OXYCODONE HYDROCHLORIDE 15 MILLIGRAM(S): 30 TABLET ORAL at 08:48

## 2024-11-26 RX ADMIN — ACETAMINOPHEN 500MG 975 MILLIGRAM(S): 500 TABLET, COATED ORAL at 06:56

## 2024-11-26 RX ADMIN — ACETAMINOPHEN 500MG 975 MILLIGRAM(S): 500 TABLET, COATED ORAL at 13:47

## 2024-11-26 RX ADMIN — Medication 4: at 07:34

## 2024-11-26 RX ADMIN — Medication 8 UNIT(S): at 07:34

## 2024-11-26 RX ADMIN — Medication 4: at 16:39

## 2024-11-26 RX ADMIN — OXYCODONE HYDROCHLORIDE 15 MILLIGRAM(S): 30 TABLET ORAL at 11:49

## 2024-11-26 RX ADMIN — OXYCODONE HYDROCHLORIDE 15 MILLIGRAM(S): 30 TABLET ORAL at 14:47

## 2024-11-26 RX ADMIN — OXYCODONE HYDROCHLORIDE 15 MILLIGRAM(S): 30 TABLET ORAL at 01:22

## 2024-11-26 NOTE — ADVANCED PRACTICE NURSE CONSULT - REASON FOR CONSULT
Patient seen on skin care rounds after wound care referral received for assessment of skin impairment and recommendations of topical management of  L BKA and R foot  Patient seen on skin care rounds after wound care referral received for assessment of skin impairment and recommendations of topical management of  L BKA and R foot. As per H&P, patient is a 54M, with hx of PNA ([facility], March 2024 - course c/b Afib and CHF - no longer active issues, not on any medications, follows with cardiology Dr. Keo Alamo, recent EF 65% and back in sinus rhythm), T2DM (A1c 8% on insulin), cervical spine stenosis admitted for C3-7 PSF - OR 11/21.

## 2024-11-26 NOTE — PROGRESS NOTE ADULT - SUBJECTIVE AND OBJECTIVE BOX
Orthopedic Surgery Progress Note     S: Patient seen and examined today. No acute events overnight. Pain is well controlled. Denies f/c, chest pain, shortness of breath, dizziness.    MEDICATIONS  (STANDING):  acetaminophen     Tablet .. 975 milliGRAM(s) Oral every 8 hours  dextrose 5%. 1000 milliLiter(s) (100 mL/Hr) IV Continuous <Continuous>  dextrose 5%. 1000 milliLiter(s) (50 mL/Hr) IV Continuous <Continuous>  dextrose 50% Injectable 25 Gram(s) IV Push once  dextrose 50% Injectable 12.5 Gram(s) IV Push once  dextrose 50% Injectable 25 Gram(s) IV Push once  glucagon  Injectable 1 milliGRAM(s) IntraMuscular once  insulin glargine Injectable (LANTUS) 28 Unit(s) SubCutaneous at bedtime  insulin lispro (ADMELOG) corrective regimen sliding scale   SubCutaneous three times a day before meals  insulin lispro (ADMELOG) corrective regimen sliding scale   SubCutaneous at bedtime  insulin lispro Injectable (ADMELOG) 8 Unit(s) SubCutaneous three times a day before meals  losartan 100 milliGRAM(s) Oral daily  pantoprazole    Tablet 40 milliGRAM(s) Oral before breakfast  polyethylene glycol 3350 17 Gram(s) Oral daily  senna 2 Tablet(s) Oral at bedtime    MEDICATIONS  (PRN):  bisacodyl 5 milliGRAM(s) Oral every 12 hours PRN Constipation  dextrose Oral Gel 15 Gram(s) Oral once PRN Blood Glucose LESS THAN 70 milliGRAM(s)/deciliter  magnesium hydroxide Suspension 30 milliLiter(s) Oral every 12 hours PRN Constipation  ondansetron Injectable 4 milliGRAM(s) IV Push every 6 hours PRN Nausea and/or Vomiting  oxyCODONE    IR 10 milliGRAM(s) Oral every 3 hours PRN Moderate Pain (4 - 6)  oxyCODONE    IR 15 milliGRAM(s) Oral every 3 hours PRN Severe Pain (7 - 10)      Vital Signs Last 24 Hrs  T(C): 37 (26 Nov 2024 05:58), Max: 37 (26 Nov 2024 05:58)  T(F): 98.6 (26 Nov 2024 05:58), Max: 98.6 (26 Nov 2024 05:58)  HR: 91 (26 Nov 2024 05:58) (86 - 101)  BP: 153/79 (26 Nov 2024 05:58) (127/67 - 153/79)  BP(mdean): 82 (25 Nov 2024 10:06) (82 - 82)  RR: 18 (26 Nov 2024 05:58) (16 - 18)  SpO2: 97% (26 Nov 2024 05:58) (97% - 100%)    Parameters below as of 26 Nov 2024 05:58  Patient On (Oxygen Delivery Method): room air        11-24-24 @ 07:01  -  11-25-24 @ 07:00  --------------------------------------------------------  IN: 0 mL / OUT: 730 mL / NET: -730 mL    11-25-24 @ 07:01  -  11-26-24 @ 06:36  --------------------------------------------------------  IN: 0 mL / OUT: 743.5 mL / NET: -743.5 mL        Physical Exam:  Constitutional: No Acute Distress   Neurological: AOx3, Following Commands  Spine: Incision c/d/i. Drain with SS output. Removed.  Sensation: [x] intact to light touch  [] decreased:   Motor exam:          Upper extremity                         Delt     Bicep     Tricep    HG                                                 R         5/5        5/5        5/5       5/5                                               L          5/5        5/5        5/5       5/5                                                 BLE: WWP, compartments soft and compressible      LABS:

## 2024-11-26 NOTE — PROGRESS NOTE ADULT - PROBLEM SELECTOR PROBLEM 1
Type 2 diabetes mellitus with hyperglycemia
Stenosis of cervical spine
Type 2 diabetes mellitus with hyperglycemia
Type 2 diabetes mellitus with hyperglycemia
Stenosis of cervical spine

## 2024-11-26 NOTE — ADVANCED PRACTICE NURSE CONSULT - RECOMMEDATIONS
Recommend iodosorb gel to base of wound 3x a week    Topical Recommendations    R plantar 2nd metatarsal pad: Cleanse with NS, pat dry. Apply Liquid barrier film to periwound skin (allow to dry). Apply Iodosorb gel to base of wound. Cover with 4x4 gauze, abdominal pad nd kerlix. Change 3x a week and PRN if soiled.    L anterior BKA: Cleanse with NS, pat dry. Apply Liquid barrier film to periwound skin (allow to dry). Apply silicone foam with borders. Change 3 days a week and PRN if soiled.    Continue low air loss bed therapy, continue heel elevation, continue to turn & reposition per protocol, soft pillow between bony prominences, continue moisture management with barrier creams & single breathable pad, continue measures to decrease friction/shear/pressure. Continue with nutritional support as per dietary/orders.    Plan of care discussed with patient and primary PA at bedside    Please contact Wound Care Service Line if we can be of further assistance (ext [phone]).  Recommend iodosorb gel to base of wound 3x a week    Continue to follow with outpatient podiatrist    Topical Recommendations    R plantar 2nd metatarsal pad: Cleanse with NS, pat dry. Apply Liquid barrier film to periwound skin (allow to dry). Apply Iodosorb gel to base of wound. Cover with 4x4 gauze, abdominal pad nd kerlix. Change 3x a week and PRN if soiled.    L anterior BKA: Cleanse with NS, pat dry. Apply Liquid barrier film to periwound skin (allow to dry). Apply silicone foam with borders. Change 3 days a week and PRN if soiled.    Continue low air loss bed therapy, continue heel elevation, continue to turn & reposition per protocol, soft pillow between bony prominences, continue moisture management with barrier creams & single breathable pad, continue measures to decrease friction/shear/pressure. Continue with nutritional support as per dietary/orders.    Plan of care discussed with patient and primary PA at bedside    Please contact Wound Care Service Line if we can be of further assistance (ext [phone]).

## 2024-11-26 NOTE — PROGRESS NOTE ADULT - SUBJECTIVE AND OBJECTIVE BOX
Follow up consult for Acute Pain Management     SUBJECTIVE:  The patient states the Oxycodone 15mg along with IV Dilaudid for breakthrough worked for his pain. IV Dilaudid discotninyed by team yeaterday. Patient states he si concerned about pain once discharged. Requesting that he needs prescriptions for Oxycodone to go home with until he finds a pain management provider.   		  OBJECTIVE:  Patient is sitting up in bed.    Pain Score:   (X) Refer to pain scores    Therapy:	[ ] IV PCA	[ ] Epidural   [ ] s/p Spinal Opioid	[ ] Peripheral nerve block  (x) PRN Oral/IV opioids and or Adjuvant non-opioid medications  	  Vital Signs Last 24 Hrs  T(C): 36.4 (26 Nov 2024 08:39), Max: 37 (26 Nov 2024 05:58)  T(F): 97.6 (26 Nov 2024 08:39), Max: 98.6 (26 Nov 2024 05:58)  HR: 94 (26 Nov 2024 08:39) (86 - 101)  BP: 154/81 (26 Nov 2024 08:39) (127/67 - 154/81)  BP(mean): --  RR: 18 (26 Nov 2024 08:39) (16 - 18)  SpO2: 100% (26 Nov 2024 08:39) (97% - 100%)    Parameters below as of 26 Nov 2024 08:39  Patient On (Oxygen Delivery Method): room air        ( x) Alert & Oriented     ( ) No motor/sensory block     ( ) Nausea     ( ) Pruritis     ( ) Headache    ASSESSMENT/ PLAN    Therapy to  be:	[x ] Continue   [ ] Discontinued      Documentation and Verification of current medications:   [X] Done	[ ] Not done, not elligible    Comments:   Explained in detail with patient that pain service is an inpatient service and is unable to prescribe outpatient. Recommended that he makes an appointment with a pain management MD outpatient. List of MDs given to patient by primary team. Discussed with primary surgical team. Pain service to sign off, no further recommendations for pain medications, at this time.  May call pain service if needed.    Progress Note written now but Patient was seen earlier.     Follow up consult for Acute Pain Management     SUBJECTIVE:  The patient states the Oxycodone 15mg along with IV Dilaudid for breakthrough worked for his pain. IV Dilaudid discotninyed by team yeaterday. Patient states he si concerned about pain once discharged. Requesting that he needs prescriptions for Oxycodone to go home with until he finds a pain management provider.   		  OBJECTIVE:  Patient is sitting up in bed.    Pain Score:   (X) Refer to pain scores    Therapy:	[ ] IV PCA	[ ] Epidural   [ ] s/p Spinal Opioid	[ ] Peripheral nerve block  (x) PRN Oral/IV opioids and or Adjuvant non-opioid medications  	  Vital Signs Last 24 Hrs  T(C): 36.4 (26 Nov 2024 08:39), Max: 37 (26 Nov 2024 05:58)  T(F): 97.6 (26 Nov 2024 08:39), Max: 98.6 (26 Nov 2024 05:58)  HR: 94 (26 Nov 2024 08:39) (86 - 101)  BP: 154/81 (26 Nov 2024 08:39) (127/67 - 154/81)  BP(mean): --  RR: 18 (26 Nov 2024 08:39) (16 - 18)  SpO2: 100% (26 Nov 2024 08:39) (97% - 100%)    Parameters below as of 26 Nov 2024 08:39  Patient On (Oxygen Delivery Method): room air        ( x) Alert & Oriented     ( ) No motor/sensory block     ( ) Nausea     ( ) Pruritis     ( ) Headache    ASSESSMENT/ PLAN    Therapy to  be:	[x ] Continue   [ ] Discontinued      Documentation and Verification of current medications:   [X] Done	[ ] Not done, not elligible    Comments:   Explained in detail with patient that pain service is an inpatient service and is unable to prescribe outpatient.  Patient refusing to go home until he gets Oxycodone to hold him over until he sees a provider in a month. Recommended that he makes an appointment with a pain management MD outpatient. List of MDs given to patient by primary team. At this time, patient proceeded to state "get out". Consult terminated. Discussed with primary surgical team. Pain service to sign off, no further recommendations for pain medications, at this time.  May call pain service if needed.    Progress Note written now but Patient was seen earlier.

## 2024-11-26 NOTE — PROGRESS NOTE ADULT - ASSESSMENT
Pt is a with 55 y/o Male s/p C3-7 PSF. Patient is hemodynamically stable. Drain removed today.    -    Multimodal Pain control-f/u with pain service, please document recs   -    Please document R foot wound recs, thank you  -    DVT ppx mechanical  -    Check AM labs  -    Monitor IGOR output  -    Weight bearing status: WBAT  -    PT/OT  -    Dispo home  -    C Collar when OOB  -    Appreciate endo recs

## 2024-11-26 NOTE — PROGRESS NOTE ADULT - SUBJECTIVE AND OBJECTIVE BOX
Chief Complaint: T2DM    Interval Events: Pt seen and examined at bedside. Pt tolerating carb consistent diet with no nausea, no vomiting.    MEDICATIONS  (STANDING):  acetaminophen     Tablet .. 975 milliGRAM(s) Oral every 8 hours  dextrose 5%. 1000 milliLiter(s) (50 mL/Hr) IV Continuous <Continuous>  dextrose 5%. 1000 milliLiter(s) (100 mL/Hr) IV Continuous <Continuous>  dextrose 50% Injectable 25 Gram(s) IV Push once  dextrose 50% Injectable 12.5 Gram(s) IV Push once  dextrose 50% Injectable 25 Gram(s) IV Push once  glucagon  Injectable 1 milliGRAM(s) IntraMuscular once  insulin glargine Injectable (LANTUS) 30 Unit(s) SubCutaneous at bedtime  insulin lispro (ADMELOG) corrective regimen sliding scale   SubCutaneous three times a day before meals  insulin lispro (ADMELOG) corrective regimen sliding scale   SubCutaneous at bedtime  insulin lispro Injectable (ADMELOG) 10 Unit(s) SubCutaneous three times a day before meals  losartan 100 milliGRAM(s) Oral daily  pantoprazole    Tablet 40 milliGRAM(s) Oral before breakfast  polyethylene glycol 3350 17 Gram(s) Oral daily  senna 2 Tablet(s) Oral at bedtime    MEDICATIONS  (PRN):  artificial tears (preservative free) Ophthalmic Solution 1 Drop(s) Left EYE three times a day PRN Dry Eyes  bisacodyl 5 milliGRAM(s) Oral every 12 hours PRN Constipation  dextrose Oral Gel 15 Gram(s) Oral once PRN Blood Glucose LESS THAN 70 milliGRAM(s)/deciliter  magnesium hydroxide Suspension 30 milliLiter(s) Oral every 12 hours PRN Constipation  ondansetron Injectable 4 milliGRAM(s) IV Push every 6 hours PRN Nausea and/or Vomiting  oxyCODONE    IR 10 milliGRAM(s) Oral every 3 hours PRN Moderate Pain (4 - 6)  oxyCODONE    IR 15 milliGRAM(s) Oral every 3 hours PRN Severe Pain (7 - 10)      Allergies    No Known Allergies    Intolerances      Review of Systems:  Eyes: No blurry vision  Cardiovascular: No chest pain, palpitations  Respiratory: No SOB, no cough  GI: No nausea, vomiting, abdominal pain  : No dysuria  Endocrine: no polyuria, polydipsia    ALL OTHER SYSTEMS REVIEWED AND NEGATIVE      VITALS: T(C): 36.4 (11-26-24 @ 13:40)  T(F): 97.5 (11-26-24 @ 13:40), Max: 98.6 (11-26-24 @ 05:58)  HR: 93 (11-26-24 @ 13:40) (90 - 101)  BP: 146/74 (11-26-24 @ 13:40) (127/67 - 154/81)  RR:  (16 - 18)  SpO2:  (97% - 100%)  Wt(kg): --      Physical Exam:   GENERAL: NAD, well-developed  EYES: No proptosis  HEENT:  Atraumatic, Normocephalic  RESPIRATORY: non labored breathing   GI: Non distended  PSYCH: Alert, normal affect, normal mood    CAPILLARY BLOOD GLUCOSE    POCT Blood Glucose.: 265 mg/dL (26 Nov 2024 11:28)  POCT Blood Glucose.: 248 mg/dL (26 Nov 2024 07:18)  POCT Blood Glucose.: 230 mg/dL (25 Nov 2024 22:16)  POCT Blood Glucose.: 222 mg/dL (25 Nov 2024 16:25)      11-24    134[L]  |  99  |  18  ----------------------------<  230[H]  4.8   |  25  |  0.89    eGFR: 101    Ca    9.4      11-24      A1C with Estimated Average Glucose Result: 8.0 % (11-15-24 @ 07:30)      Thyroid Function Tests:

## 2024-11-26 NOTE — PROGRESS NOTE ADULT - PROVIDER SPECIALTY LIST ADULT
Anesthesia
Orthopedics
Pain Medicine
Endocrinology
Orthopedics
Pain Medicine
Endocrinology
Orthopedics
Orthopedics
Hospitalist
Hospitalist
Endocrinology

## 2024-11-26 NOTE — PROGRESS NOTE ADULT - NS ATTEND AMEND GEN_ALL_CORE FT
54M DM2 complicated by lower extremity amputations here for spinal surgery. Also possible history of CHF although recently told EF is normal.  Inpatient agree with basal bolus insulin. On dc resume basal at increased dose and metformin (could not add Ozempic due to insurance).    Feng Ferrari MD  Division of Endocrinology  Pager: [phone]    If after 6PM or before 9AM, or on weekends/holidays, please call endocrine answering service for assistance ([phone]).  For nonurgent matters email [email or web address] for assistance.
54M DM2 complicated by lower extremity amputations here for spinal surgery. Also possible history of CHF although recently told EF is normal.  Inpatient increase basal bolus insulin. On dc resume basal, metformin and add Ozempic.    Feng Ferrari MD  Division of Endocrinology  Pager: [phone]    If after 6PM or before 9AM, or on weekends/holidays, please call endocrine answering service for assistance ([phone]).  For nonurgent matters email [email or web address] for assistance.
54M DM2 complicated by lower extremity amputations here for spinal surgery. Also possible history of CHF although recently told EF is normal.  Inpatient increase basal bolus insulin. On dc resume basal at increased dose and metformin (could not add Ozempic due to insurance).    Feng Ferrari MD  Division of Endocrinology  Pager: [phone]    If after 6PM or before 9AM, or on weekends/holidays, please call endocrine answering service for assistance ([phone]).  For nonurgent matters email [email or web address] for assistance.

## 2024-12-02 ENCOUNTER — NON-APPOINTMENT (OUTPATIENT)
Age: 54
End: 2024-12-02

## 2024-12-02 PROBLEM — Z98.1 S/P SPINAL FUSION: Status: ACTIVE | Noted: 2024-12-02

## 2024-12-10 ENCOUNTER — RX RENEWAL (OUTPATIENT)
Age: 54
End: 2024-12-10

## 2024-12-10 RX ORDER — OXYCODONE 10 MG/1
10 TABLET ORAL
Qty: 42 | Refills: 0 | Status: ACTIVE | COMMUNITY
Start: 2024-12-02 | End: 1900-01-01

## 2024-12-11 ENCOUNTER — APPOINTMENT (OUTPATIENT)
Dept: ORTHOPEDIC SURGERY | Facility: CLINIC | Age: 54
End: 2024-12-11
Payer: MEDICARE

## 2024-12-11 DIAGNOSIS — Z98.1 ARTHRODESIS STATUS: ICD-10-CM

## 2024-12-11 DIAGNOSIS — Z00.00 ENCOUNTER FOR GENERAL ADULT MEDICAL EXAMINATION W/OUT ABNORMAL FINDINGS: ICD-10-CM

## 2024-12-11 DIAGNOSIS — M47.12 OTHER SPONDYLOSIS WITH MYELOPATHY, CERVICAL REGION: ICD-10-CM

## 2024-12-11 PROCEDURE — 72040 X-RAY EXAM NECK SPINE 2-3 VW: CPT

## 2024-12-11 PROCEDURE — 99024 POSTOP FOLLOW-UP VISIT: CPT

## 2024-12-18 ENCOUNTER — RX RENEWAL (OUTPATIENT)
Age: 54
End: 2024-12-18

## 2024-12-27 ENCOUNTER — RX RENEWAL (OUTPATIENT)
Age: 54
End: 2024-12-27

## 2025-01-08 ENCOUNTER — APPOINTMENT (OUTPATIENT)
Dept: ORTHOPEDIC SURGERY | Facility: CLINIC | Age: 55
End: 2025-01-08
Payer: MEDICARE

## 2025-01-08 DIAGNOSIS — M47.12 OTHER SPONDYLOSIS WITH MYELOPATHY, CERVICAL REGION: ICD-10-CM

## 2025-01-08 DIAGNOSIS — Z98.1 ARTHRODESIS STATUS: ICD-10-CM

## 2025-01-08 PROCEDURE — 72040 X-RAY EXAM NECK SPINE 2-3 VW: CPT

## 2025-01-08 PROCEDURE — 99024 POSTOP FOLLOW-UP VISIT: CPT

## 2025-01-13 ENCOUNTER — APPOINTMENT (OUTPATIENT)
Dept: ENDOCRINOLOGY | Facility: CLINIC | Age: 55
End: 2025-01-13
Payer: MEDICARE

## 2025-01-13 VITALS
OXYGEN SATURATION: 97 % | HEART RATE: 74 BPM | HEIGHT: 71 IN | BODY MASS INDEX: 28.7 KG/M2 | SYSTOLIC BLOOD PRESSURE: 140 MMHG | WEIGHT: 205 LBS | DIASTOLIC BLOOD PRESSURE: 90 MMHG

## 2025-01-13 DIAGNOSIS — E11.9 TYPE 2 DIABETES MELLITUS W/OUT COMPLICATIONS: ICD-10-CM

## 2025-01-13 DIAGNOSIS — I10 ESSENTIAL (PRIMARY) HYPERTENSION: ICD-10-CM

## 2025-01-13 DIAGNOSIS — E78.5 HYPERLIPIDEMIA, UNSPECIFIED: ICD-10-CM

## 2025-01-13 LAB — HBA1C MFR BLD HPLC: 7.4

## 2025-01-13 PROCEDURE — G2211 COMPLEX E/M VISIT ADD ON: CPT

## 2025-01-13 PROCEDURE — 83036 HEMOGLOBIN GLYCOSYLATED A1C: CPT | Mod: QW

## 2025-01-13 PROCEDURE — 99214 OFFICE O/P EST MOD 30 MIN: CPT

## 2025-01-13 RX ORDER — PEN NEEDLE, DIABETIC 30 GX3/16"
30G X 5 MM NEEDLE, DISPOSABLE MISCELLANEOUS
Qty: 100 | Refills: 3 | Status: ACTIVE | COMMUNITY
Start: 2025-01-13 | End: 1900-01-01

## 2025-01-13 RX ORDER — INSULIN GLARGINE 100 [IU]/ML
100 INJECTION, SOLUTION SUBCUTANEOUS
Qty: 3 | Refills: 3 | Status: ACTIVE | COMMUNITY
Start: 2025-01-13 | End: 1900-01-01

## 2025-01-14 LAB
ALBUMIN SERPL ELPH-MCNC: 4 G/DL
ALP BLD-CCNC: 118 U/L
ALT SERPL-CCNC: 16 U/L
ANION GAP SERPL CALC-SCNC: 11 MMOL/L
AST SERPL-CCNC: 12 U/L
BILIRUB SERPL-MCNC: 0.2 MG/DL
BUN SERPL-MCNC: 28 MG/DL
CALCIUM SERPL-MCNC: 9.6 MG/DL
CHLORIDE SERPL-SCNC: 108 MMOL/L
CHOLEST SERPL-MCNC: 153 MG/DL
CO2 SERPL-SCNC: 21 MMOL/L
CREAT SERPL-MCNC: 1.28 MG/DL
CREAT SPEC-SCNC: 148 MG/DL
EGFR: 66 ML/MIN/1.73M2
GLUCOSE SERPL-MCNC: 268 MG/DL
HDLC SERPL-MCNC: 43 MG/DL
LDLC SERPL CALC-MCNC: 88 MG/DL
MICROALBUMIN 24H UR DL<=1MG/L-MCNC: 37.5 MG/DL
MICROALBUMIN/CREAT 24H UR-RTO: 253 MG/G
NONHDLC SERPL-MCNC: 110 MG/DL
POTASSIUM SERPL-SCNC: 4.4 MMOL/L
PROT SERPL-MCNC: 7 G/DL
SODIUM SERPL-SCNC: 140 MMOL/L
TRIGL SERPL-MCNC: 124 MG/DL

## 2025-01-14 RX ORDER — ATORVASTATIN CALCIUM 10 MG/1
10 TABLET, FILM COATED ORAL
Qty: 90 | Refills: 3 | Status: ACTIVE | COMMUNITY
Start: 2025-01-14 | End: 1900-01-01

## 2025-01-22 ENCOUNTER — APPOINTMENT (OUTPATIENT)
Dept: PAIN MANAGEMENT | Facility: CLINIC | Age: 55
End: 2025-01-22

## 2025-02-10 ENCOUNTER — RX RENEWAL (OUTPATIENT)
Age: 55
End: 2025-02-10

## 2025-02-19 ENCOUNTER — APPOINTMENT (OUTPATIENT)
Dept: ORTHOPEDIC SURGERY | Facility: CLINIC | Age: 55
End: 2025-02-19

## 2025-03-22 ENCOUNTER — NON-APPOINTMENT (OUTPATIENT)
Age: 55
End: 2025-03-22

## 2025-04-03 ENCOUNTER — APPOINTMENT (OUTPATIENT)
Dept: ENDOCRINOLOGY | Facility: CLINIC | Age: 55
End: 2025-04-03

## 2025-04-09 ENCOUNTER — APPOINTMENT (OUTPATIENT)
Dept: ORTHOPEDIC SURGERY | Facility: CLINIC | Age: 55
End: 2025-04-09
Payer: MEDICARE

## 2025-04-09 VITALS — WEIGHT: 200 LBS | HEIGHT: 71 IN | BODY MASS INDEX: 28 KG/M2

## 2025-04-09 DIAGNOSIS — Z98.1 ARTHRODESIS STATUS: ICD-10-CM

## 2025-04-09 PROCEDURE — 99213 OFFICE O/P EST LOW 20 MIN: CPT

## 2025-04-09 PROCEDURE — 72040 X-RAY EXAM NECK SPINE 2-3 VW: CPT

## 2025-08-12 ENCOUNTER — RX RENEWAL (OUTPATIENT)
Age: 55
End: 2025-08-12

## (undated) DEVICE — VENODYNE/SCD SLEEVE CALF MEDIUM

## (undated) DEVICE — DRILL BIT NUVASIVE VUE POINT

## (undated) DEVICE — DRSG DERMABOND PRINEO 42CM

## (undated) DEVICE — LABELS BLANK W PEN

## (undated) DEVICE — STAPLER SKIN MULTI DIRECTION W35

## (undated) DEVICE — DRAPE SURGICAL #1010

## (undated) DEVICE — Device

## (undated) DEVICE — PREP DURAPREP 26CC

## (undated) DEVICE — SPONGE DISSECTOR PEANUT

## (undated) DEVICE — PACK MINOR WITH LAP

## (undated) DEVICE — DRSG CURITY GAUZE SPONGE 4 X 4" 12-PLY

## (undated) DEVICE — DRSG TELFA 3 X 8

## (undated) DEVICE — DRAPE SPLIT SHEET 77" X 120"

## (undated) DEVICE — WARMING BLANKET FULL ADULT

## (undated) DEVICE — POSITIONER PINK PAD PIGAZZI SYSTEM W ARM PROTECTOR

## (undated) DEVICE — SOL IRR POUR NS 0.9% 1500ML

## (undated) DEVICE — DRAPE TOWEL BLUE 17" X 24"

## (undated) DEVICE — TUBING FOR SMOKE EVACUATOR (PURPLE END)

## (undated) DEVICE — TAPE SILK 3"

## (undated) DEVICE — ELCTR BOVIE PENCIL BLADE 10FT

## (undated) DEVICE — ELCTR BOVIE TIP BLADE INSULATED 4" EDGE

## (undated) DEVICE — DRAPE BACK TABLE COVER 44X90"

## (undated) DEVICE — PACK NEURO

## (undated) DEVICE — DRAPE 3/4 SHEET 52X76"

## (undated) DEVICE — NDL SPINAL 18G X 3.5" (PINK)

## (undated) DEVICE — ELCTR GROUNDING PAD ADULT COVIDIEN

## (undated) DEVICE — DRAPE C ARM C-ARMOUR

## (undated) DEVICE — DRAPE MINOR PROCEDURE

## (undated) DEVICE — LIJ-KMEDIC KERRISON RONGUER: Type: DURABLE MEDICAL EQUIPMENT

## (undated) DEVICE — POSITIONER JACKSON TABLE CHEST, HIP, THIGH PADS, ARM CRADLE

## (undated) DEVICE — SOL IRR POUR H2O 1500ML

## (undated) DEVICE — MIDAS REX MR8 MATCH HEAD FLUTED LG BORE 3MM X 14CM

## (undated) DEVICE — DRSG TEGADERM 4 X 4.75"

## (undated) DEVICE — GLV 8 PROTEXIS (WHITE)

## (undated) DEVICE — POSITIONER STRAP ARMBOARD VELCRO TS-30

## (undated) DEVICE — MIDAS REX MR7 LUBRICANT DIFFUSER CARTRIDGE

## (undated) DEVICE — DRAPE COVER SNAP 36X30"

## (undated) DEVICE — FOLEY CATH 2-WAY 16FR 5CC SILICONE

## (undated) DEVICE — POSITIONER FOAM EGG CRATE ULNAR 2PCS (PINK)

## (undated) DEVICE — BIPOLAR FORCEP HENSLER BAYONET 8" X 1MM (YELLOW)

## (undated) DEVICE — NDL HYPO SAFE 21G X 1.5" (GREEN)

## (undated) DEVICE — SUT VICRYL PLUS 2-0 18" CP-2 UNDYED (POP-OFF)

## (undated) DEVICE — DRAIN RESERVOIR FOR JACKSON PRATT 100CC CARDINAL

## (undated) DEVICE — GLV 9 PROTEXIS (WHITE)

## (undated) DEVICE — FOLEY TRAY 14FR 5CC LF UMETER CLOSED

## (undated) DEVICE — ELCTR BOVIE TIP BLADE INSULATED 2.75" EDGE

## (undated) DEVICE — DVC HARVESTING STRL

## (undated) DEVICE — ELCTR BOVIE PENCIL SMOKE EVACUATION

## (undated) DEVICE — DRAPE MAYO STAND 30"

## (undated) DEVICE — SUT VICRYL PLUS 0 18" CT-1 UNDYED (POP-OFF)

## (undated) DEVICE — DRAPE INSTRUMENT POUCH 6.75" X 11"

## (undated) DEVICE — SOL IRR POUR NS 0.9% 500ML

## (undated) DEVICE — STRYKER BONE MILL & MEDIUM BLADE

## (undated) DEVICE — BASIN SET DOUBLE

## (undated) DEVICE — PREP BETADINE KIT